# Patient Record
Sex: MALE | Race: WHITE | NOT HISPANIC OR LATINO | Employment: STUDENT | URBAN - METROPOLITAN AREA
[De-identification: names, ages, dates, MRNs, and addresses within clinical notes are randomized per-mention and may not be internally consistent; named-entity substitution may affect disease eponyms.]

---

## 2020-01-21 ENCOUNTER — OFFICE VISIT (OUTPATIENT)
Dept: INTERNAL MEDICINE CLINIC | Facility: CLINIC | Age: 25
End: 2020-01-21
Payer: COMMERCIAL

## 2020-01-21 VITALS
BODY MASS INDEX: 39.11 KG/M2 | SYSTOLIC BLOOD PRESSURE: 130 MMHG | DIASTOLIC BLOOD PRESSURE: 82 MMHG | OXYGEN SATURATION: 97 % | HEIGHT: 67 IN | HEART RATE: 88 BPM | WEIGHT: 249.2 LBS | TEMPERATURE: 98.5 F

## 2020-01-21 DIAGNOSIS — I49.1 PREMATURE ATRIAL CONTRACTION: Primary | ICD-10-CM

## 2020-01-21 DIAGNOSIS — Z23 NEED FOR TDAP VACCINATION: ICD-10-CM

## 2020-01-21 DIAGNOSIS — R94.31 ST SEGMENT ABNORMALITY: ICD-10-CM

## 2020-01-21 DIAGNOSIS — Z00.00 HEALTHCARE MAINTENANCE: ICD-10-CM

## 2020-01-21 DIAGNOSIS — Z11.4 ENCOUNTER FOR SCREENING FOR HIV: ICD-10-CM

## 2020-01-21 DIAGNOSIS — I49.9 IRREGULAR HEART RATE: ICD-10-CM

## 2020-01-21 PROCEDURE — 90715 TDAP VACCINE 7 YRS/> IM: CPT | Performed by: NURSE PRACTITIONER

## 2020-01-21 PROCEDURE — 1036F TOBACCO NON-USER: CPT | Performed by: NURSE PRACTITIONER

## 2020-01-21 PROCEDURE — 99204 OFFICE O/P NEW MOD 45 MIN: CPT | Performed by: NURSE PRACTITIONER

## 2020-01-21 PROCEDURE — 3008F BODY MASS INDEX DOCD: CPT | Performed by: NURSE PRACTITIONER

## 2020-01-21 PROCEDURE — 93000 ELECTROCARDIOGRAM COMPLETE: CPT | Performed by: NURSE PRACTITIONER

## 2020-01-21 PROCEDURE — 90471 IMMUNIZATION ADMIN: CPT | Performed by: NURSE PRACTITIONER

## 2020-01-21 RX ORDER — FEXOFENADINE HYDROCHLORIDE 60 MG/1
60 TABLET, FILM COATED ORAL DAILY
COMMUNITY

## 2020-01-21 NOTE — ASSESSMENT & PLAN NOTE
EKG performed in the office today which showed a nonspecific ST abnormality  Echocardiogram and referral to Cardiology has been ordered  The patient is asymptomatic

## 2020-01-21 NOTE — ASSESSMENT & PLAN NOTE
On physical exam the patient did have an irregular heart rate and EKG was performed in the office today  EKG did show a sinus rhythm with premature atrial complexes and nonspecific ST abnormality  An echocardiogram has been ordered for the patient  He is asymptomatic and is unaware of his irregular heart rate  He is unsure if this is something he has had in his past as I do not have his previous medical records and he is new to this practice

## 2020-01-21 NOTE — PATIENT INSTRUCTIONS
Good fat  Good fats come mainly from vegetables, nuts, seeds, and fish  They differ from saturated fats by having fewer hydrogen atoms bonded to their carbon chains  Healthy fats are liquid at room temperature, not solid  There are two broad categories of beneficial fats: monounsaturated and polyunsaturated fats  Monounsaturated fats  When you dip your bread in olive oil at an [place], you're getting mostly monounsaturated fat  Monounsaturated fats have a single carbon-to-carbon double bond  The result is that it has two fewer hydrogen atoms than a saturated fat and a bend at the double bond  This structure keeps monounsaturated fats liquid at room temperature  Good sources of monounsaturated fats are olive oil, peanut oil, canola oil, avocados, and most nuts, as well as high-oleic safflower and sunflower oils  The discovery that monounsaturated fat could be healthful came from the [place] Study during the 1960s  It revealed that people in [place] and other parts of the [facility] enjoyed a low rate of heart disease despite a high-fat diet  The main fat in their diet, though, was not the saturated animal fat common in countries with higher rates of heart disease  It was olive oil, which contains mainly monounsaturated fat  This finding produced a surge of interest in olive oil and the "Mediterranean diet," a style of eating regarded as a healthful choice today  Although there's no recommended daily intake of monounsaturated fats, the [place] [employer] recommends using them as much as possible along with polyunsaturated fats to replace saturated and trans fats  Polyunsaturated fats  When you pour liquid cooking oil into a pan, there's a good chance you're using polyunsaturated fat  Corn oil, sunflower oil, and safflower oil are common examples  Polyunsaturated fats are essential fats  That means they're required for normal body functions but your body can't make them   So you must get them from food  Polyunsaturated fats are used to build cell membranes and the covering of nerves  They are needed for blood clotting, muscle movement, and inflammation  A polyunsaturated fat has two or more double bonds in its carbon chain  There are two main types of polyunsaturated fats: omega-3 fatty acids and omega-6 fatty acids  The numbers refer to the distance between the beginning of the carbon chain and the first double bond  Both types offer health benefits  Eating polyunsaturated fats in place of saturated fats or highly refined carbohydrates reduces harmful LDL cholesterol and improves the cholesterol profile  It also lowers triglycerides  Good sources of omega-3 fatty acids include fatty fish such as salmon, mackerel, and sardines, flaxseeds, walnuts, canola oil, and unhydrogenated soybean oil  Omega-3 fatty acids may help prevent and even treat heart disease and stroke  In addition to reducing blood pressure, raising HDL, and lowering triglycerides, polyunsaturated fats may help prevent lethal heart rhythms from arising  Evidence also suggests they may help reduce the need for corticosteroid medications in people with rheumatoid arthritis  Studies linking omega-3s to a wide range of other health improvements, including reducing risk of dementia, are inconclusive, and some of them have major flaws, according to a systematic review of the evidence by the Agency for Healthcare Research and Quality  Omega-6 fatty acids have also been linked to protection against heart disease  Foods rich in linoleic acid and other omega-6 fatty acids include vegetable oils such as safflower, soybean, sunflower, walnut, and corn oils  Weight Management   WHAT YOU NEED TO KNOW:   Being overweight increases your risk of health conditions such as heart disease, high blood pressure, type 2 diabetes, and certain types of cancer   It can also increase your risk for osteoarthritis, sleep apnea, and other respiratory problems  Aim for a slow, steady weight loss  Even a small amount of weight loss can lower your risk of health problems  DISCHARGE INSTRUCTIONS:   How to lose weight safely:  A safe and healthy way to lose weight is to eat fewer calories and get regular exercise  You can lose up about 1 pound a week by decreasing the number of calories you eat by 500 calories each day  You can decrease calories by eating smaller portion sizes or by cutting out high-calorie foods  Read labels to find out how many calories are in the foods you eat  You can also burn calories with exercise such as walking, swimming, or biking  You will be more likely to keep weight off if you make these changes part of your lifestyle  Healthy meal plan for weight management:  A healthy meal plan includes a variety of foods, contains fewer calories, and helps you stay healthy  A healthy meal plan includes the following:  · Eat whole-grain foods more often  A healthy meal plan should contain fiber  Fiber is the part of grains, fruits, and vegetables that is not broken down by your body  Whole-grain foods are healthy and provide extra fiber in your diet  Some examples of whole-grain foods are whole-wheat breads and pastas, oatmeal, brown rice, and bulgur  · Eat a variety of vegetables every day  Include dark, leafy greens such as spinach, kale, clyde greens, and mustard greens  Eat yellow and orange vegetables such as carrots, sweet potatoes, and winter squash  · Eat a variety of fruits every day  Choose fresh or canned fruit (canned in its own juice or light syrup) instead of juice  Fruit juice has very little or no fiber  · Eat low-fat dairy foods  Drink fat-free (skim) milk or 1% milk  Eat fat-free yogurt and low-fat cottage cheese  Try low-fat cheeses such as mozzarella and other reduced-fat cheeses  · Choose meat and other protein foods that are low in fat    Choose beans or other legumes such as split peas or lentils  Choose fish, skinless poultry (chicken or turkey), or lean cuts of red meat (beef or pork)  Before you cook meat or poultry, cut off any visible fat  · Use less fat and oil  Try baking foods instead of frying them  Add less fat, such as margarine, sour cream, regular salad dressing, and mayonnaise to foods  Eat fewer high-fat foods  Some examples of high-fat foods include french fries, doughnuts, ice cream, and cakes  · Eat fewer sweets  Limit foods and drinks that are high in sugar  This includes candy, cookies, regular soda, and sweetened drinks  Ways to decrease calories:   · Eat smaller portions  ¨ Use a small plate with smaller servings  ¨ Do not eat second helpings  ¨ When you eat at a restaurant, ask for a box and place half of your meal in the box before you eat  ¨ Share an entrée with someone else  · Replace high-calorie snacks with healthy, low-calorie snacks  ¨ Choose fresh fruit, vegetables, fat-free rice cakes, or air-popped popcorn instead of potato chips, nuts, or chocolate  ¨ Choose water or calorie-free drinks instead of soda or sweetened drinks  · Eat regular meals  Skipping meals can lead to overeating later in the day  Eat a healthy snack in place of a meal if you do not have time to eat a regular meal      · Do not shop for groceries when you are hungry  You may be more likely to make unhealthy food choices  Take a grocery list of healthy foods and shop after you have eaten  Exercise:  Exercise at least 30 minutes per day on most days of the week  Some examples of exercise include walking, biking, dancing, and swimming  You can also fit in more physical activity by taking the stairs instead of the elevator or parking farther away from stores  Ask your healthcare provider about the best exercise plan for you     Other things to consider as you try to lose weight:   · Be aware of situations that may give you the urge to overeat, such as eating while watching television  Find ways to avoid these situations  For example, read a book, go for a walk, or do crafts  · Meet with a weight loss support group or friends who are also trying to lose weight  This may help you stay motivated to continue working on your weight loss goals  © 2017 [address] [employer] Information is for End User's use only and may not be sold, redistributed or otherwise used for commercial purposes  All illustrations and images included in CareNotes® are the copyrighted property of [employer]  or Jon Stevens  The above information is an  only  It is not intended as medical advice for individual conditions or treatments  Talk to your doctor, nurse or pharmacist before following any medical regimen to see if it is safe and effective for you  Premature Atrial Contractions   WHAT YOU NEED TO KNOW:   Premature atrial contractions (PACs) are an interruption in your heart rhythm  PACs happen when your heart gets an early signal to pump  PACs are common and usually have no cause  Most people have skipped heartbeats from time to time  Follow up with your healthcare provider so the cause of your [facility] can be diagnosed and treated  DISCHARGE INSTRUCTIONS:   Call 911 for any of the following: You have any of the following signs of a heart attack:   · Squeezing, pressure, or pain in your chest that lasts longer than 5 minutes or returns    · Discomfort or pain in your back, neck, jaw, stomach, or arm     · Trouble breathing    · Nausea or vomiting    · Lightheadedness or a sudden cold sweat, especially with chest pain or trouble breathing  Contact your healthcare provider if:   · Your symptoms do not go away, or they get worse  · You have questions or concerns about your condition or care  Medicines:   · Medicines  may be given to make your heart beat at a regular rate and rhythm  · Take your medicine as directed    Contact your healthcare provider if you think your medicine is not helping or if you have side effects  Tell him or her if you are allergic to any medicine  Keep a list of the medicines, vitamins, and herbs you take  Include the amounts, and when and why you take them  Bring the list or the pill bottles to follow-up visits  Carry your medicine list with you in case of an emergency  Follow up with your healthcare provider as directed:  Bring your Holter monitor and results with you  Write down your questions so you remember to ask them during your visits  Prevent more PACs:   · Do not have alcohol or caffeine  These can increase your PACs  · Do not smoke  Nicotine and other chemicals in cigarettes and cigars can increase heart problems  Ask your healthcare provider for information if you currently smoke and need help to quit  E-cigarettes or smokeless tobacco still contain nicotine  Talk to your healthcare provider before you use these products  · Exercise as directed  Exercise helps keep your heart healthy  Ask your healthcare provider about the best exercise plan for you  © 2017 [facility] [facility] Information is for End User's use only and may not be sold, redistributed or otherwise used for commercial purposes  All illustrations and images included in CareNotes® are the copyrighted property of [employer] [facility] [employer]  or Jon Stevens  The above information is an  only  It is not intended as medical advice for individual conditions or treatments  Talk to your doctor, nurse or pharmacist before following any medical regimen to see if it is safe and effective for you

## 2020-01-21 NOTE — PROGRESS NOTES
INTERNAL MEDICINE INITIAL OFFICE VISIT  [facility] [address] [place] [address]    NAME: Walt Christopher  AGE: 22 y o  SEX: male  : 1995     DATE: 2020     Assessment and Plan:     Problem List Items Addressed This Visit        Cardiovascular and Mediastinum    Premature atrial contraction - Primary       On physical exam the patient did have an irregular heart rate and EKG was performed in the office today  EKG did show a sinus rhythm with premature atrial complexes and nonspecific ST abnormality  An echocardiogram has been ordered for the patient  He is asymptomatic and is unaware of his irregular heart rate  He is unsure if this is something he has had in his past as I do not have his previous medical records and he is new to this practice  Relevant Orders    Echo complete with contrast if indicated    Ambulatory referral to Cardiology       Other    ST segment abnormality       EKG performed in the office today which showed a nonspecific ST abnormality  Echocardiogram and referral to Cardiology has been ordered  The patient is asymptomatic  Relevant Orders    Echo complete with contrast if indicated    Ambulatory referral to Cardiology      Other Visit Diagnoses     Healthcare maintenance        Relevant Orders    Hemoglobin A1C    TSH, 3rd generation with Free T4 reflex    Lipid Panel with Direct LDL reflex    Comprehensive metabolic panel    CBC    UA (URINE) with reflex to Scope    TDAP VACCINE GREATER THAN OR EQUAL TO 8YO IM (Completed)    Encounter for screening for HIV        Relevant Orders    HIV 1/2 AG-AB combo    Need for Tdap vaccination        Relevant Orders    TDAP VACCINE GREATER THAN OR EQUAL TO 8YO IM (Completed)    Irregular heart rate        Relevant Orders    POCT ECG          No follow-ups on file       Chief Complaint:     Chief Complaint   Patient presents with   [address] [place] [address] Patient      History of Present Illness: Chiqui Wolff presents to the office today for new patient visit  He is a 26-year-old male who is new to the practice  The patient does not have any pertinent past medical history with the exception of seasonal allergies  His only surgery was wisdom teeth removal   He does not have any current lab work to review  The patient was given a Tdap booster in the office today  A flu shot was recommended, however, we do not have any regular flu shot left in stock and the patient was recommended to get this performed at a local pharmacy  The patient's BMI is 39 and a discussion was had with the patient regarding weight management and healthy eating  On physical exam the patient did have an irregular heart rate and EKG was performed in the office today  The patient denies any chest pain, shortness of breath or lower extremity edema  It does show normal sinus rhythm with premature atrial complexes and nonspecific ST abnormality  An echocardiogram was ordered for the patient along with a referral cardiology  Blood work has also been ordered for this patient  The following portions of the patient's history were reviewed and updated as appropriate: allergies, current medications, past family history, past medical history, past social history, past surgical history and problem list      Review of Systems:     Review of Systems   Constitutional: Negative  HENT: Negative  Eyes: Positive for visual disturbance (wears glasses)  Respiratory: Negative  Cardiovascular: Negative  Gastrointestinal: Negative  Genitourinary: Negative  Musculoskeletal: Negative  Skin: Negative  Neurological: Negative  Psychiatric/Behavioral: Negative  Past Medical History:   No past medical history on file       Past Surgical History:     Past Surgical History:   Procedure Laterality Date    WISDOM TOOTH EXTRACTION  2016    all 4         Social History:     Social History     Socioeconomic History    Marital status: Single     Spouse name: None    Number of children: None    Years of education: None    Highest education level: None   Occupational History    None   Social Needs    Financial resource strain: None    Food insecurity:     Worry: None     Inability: None    Transportation needs:     Medical: None     Non-medical: None   Tobacco Use    Smoking status: Never Smoker    Smokeless tobacco: Never Used   Substance and Sexual Activity    Alcohol use: Yes     Frequency: 2-4 times a month     Drinks per session: 1 or 2     Binge frequency: Never    Drug use: Not Currently    Sexual activity: Not Currently   Lifestyle    Physical activity:     Days per week: 5 days     Minutes per session: 30 min    Stress: Not at all   Relationships    Social connections:     Talks on phone: None     Gets together: None     Attends Pentecostalism service: None     Active member of club or organization: None     Attends meetings of clubs or organizations: None     Relationship status: None    Intimate partner violence:     Fear of current or ex partner: None     Emotionally abused: None     Physically abused: None     Forced sexual activity: None   Other Topics Concern    None   Social History Narrative    None         Family History:   No family history on file  Current Medications:     Current Outpatient Medications:     fexofenadine (ALLEGRA) 60 MG tablet, Take 60 mg by mouth daily, Disp: , Rfl:      Allergies: Allergies   Allergen Reactions    Codeine         Physical Exam:     /82 (BP Location: Left arm, Patient Position: Sitting)   Pulse 88   Temp 98 5 °F (36 9 °C)   Ht 5' 7" (1 702 m)   Wt 113 kg (249 lb 3 2 oz)   SpO2 97%   BMI 39 03 kg/m²     Physical Exam   Constitutional: He is oriented to person, place, and time  He appears well-developed and well-nourished  No distress  HENT:   Head: Normocephalic and atraumatic     Right Ear: External ear normal    Left Ear: External ear normal  Nose: Nose normal    Mouth/Throat: Oropharynx is clear and moist  No oropharyngeal exudate  Eyes: Pupils are equal, round, and reactive to light  EOM are normal    Neck: Normal range of motion  Neck supple  No JVD present  Cardiovascular: Normal rate  An irregular rhythm present  No murmur heard  Pulmonary/Chest: Effort normal and breath sounds normal    Abdominal: Soft  Bowel sounds are normal    Musculoskeletal: Normal range of motion  Lymphadenopathy:     He has no cervical adenopathy  Neurological: He is alert and oriented to person, place, and time  Skin: Skin is warm and dry  Psychiatric: He has a normal mood and affect   His behavior is normal  Judgment and thought content normal          [address]

## 2020-01-31 ENCOUNTER — TELEPHONE (OUTPATIENT)
Dept: CARDIOLOGY CLINIC | Facility: CLINIC | Age: 25
End: 2020-01-31

## 2020-02-18 ENCOUNTER — CONSULT (OUTPATIENT)
Dept: CARDIOLOGY CLINIC | Facility: CLINIC | Age: 25
End: 2020-02-18
Payer: COMMERCIAL

## 2020-02-18 VITALS
SYSTOLIC BLOOD PRESSURE: 126 MMHG | DIASTOLIC BLOOD PRESSURE: 78 MMHG | BODY MASS INDEX: 38.44 KG/M2 | OXYGEN SATURATION: 90 % | RESPIRATION RATE: 18 BRPM | HEIGHT: 67 IN | WEIGHT: 244.9 LBS | HEART RATE: 90 BPM

## 2020-02-18 DIAGNOSIS — I49.1 PREMATURE ATRIAL CONTRACTION: ICD-10-CM

## 2020-02-18 DIAGNOSIS — R94.31 ST SEGMENT ABNORMALITY: ICD-10-CM

## 2020-02-18 PROCEDURE — 3008F BODY MASS INDEX DOCD: CPT | Performed by: INTERNAL MEDICINE

## 2020-02-18 PROCEDURE — 99243 OFF/OP CNSLTJ NEW/EST LOW 30: CPT | Performed by: INTERNAL MEDICINE

## 2020-02-18 PROCEDURE — 1036F TOBACCO NON-USER: CPT | Performed by: INTERNAL MEDICINE

## 2020-02-18 NOTE — PROGRESS NOTES
Cardiology Office Note    Lilia Herrera 22 y o  male MRN: [id number]    02/18/20          Assessment/Plan:  1  PACs noted on 12 lead ECG- he is asymptomatic-  will evaluate with a 24 hour Holter moniotr      1  Premature atrial contraction  Ambulatory referral to Cardiology   2  ST segment abnormality  Ambulatory referral to Cardiology       HPI: Lilia Herrera is a 22y o  year old male with history of seasonal allergies who was referred by his PCP Hardeep Escobar to establish care  He was noted to have an irregular heart rhythm on physical examination  ECG revealed NSR with early repolarization and PACs  He denies chest pain, palpitations, presyncope, syncope, lower extremity edema or any other concerns  He denies any other past cardiac history  He denies family history of cardiac disease  He denies tobacco, alcohol and recreational drug use  The only medication he takes is Allegra for seasonal allergies  He denies any other concerns at this time  Patient Active Problem List   Diagnosis    Premature atrial contraction    ST segment abnormality       Allergies   Allergen Reactions    Codeine          Current Outpatient Medications:     fexofenadine (ALLEGRA) 60 MG tablet, Take 60 mg by mouth daily, Disp: , Rfl:     History reviewed  No pertinent past medical history      Family History   Problem Relation Age of Onset    Thyroid disease Mother     No Known Problems Father     Asthma Brother        Past Surgical History:   Procedure Laterality Date    WISDOM TOOTH EXTRACTION  2016    all 4        Social History     Socioeconomic History    Marital status: Single     Spouse name: Not on file    Number of children: Not on file    Years of education: Not on file    Highest education level: Not on file   Occupational History    Not on file   Social Needs    Financial resource strain: Not on file    Food insecurity:     Worry: Not on file     Inability: Not on file   LocBox needs: Medical: Not on file     Non-medical: Not on file   Tobacco Use    Smoking status: Never Smoker    Smokeless tobacco: Never Used   Substance and Sexual Activity    Alcohol use: Yes     Frequency: 2-4 times a month     Drinks per session: 1 or 2     Binge frequency: Never    Drug use: Not Currently    Sexual activity: Not Currently   Lifestyle    Physical activity:     Days per week: 5 days     Minutes per session: 30 min    Stress: Not at all   Relationships    Social connections:     Talks on phone: Not on file     Gets together: Not on file     Attends Nondenominational service: Not on file     Active member of club or organization: Not on file     Attends meetings of clubs or organizations: Not on file     Relationship status: Not on file    Intimate partner violence:     Fear of current or ex partner: Not on file     Emotionally abused: Not on file     Physically abused: Not on file     Forced sexual activity: Not on file   Other Topics Concern    Not on file   Social History Narrative    Not on file       Review of Systems   Constitution: Negative for diaphoresis, weight gain and weight loss  HENT: Negative for congestion  Cardiovascular: Negative for chest pain, dyspnea on exertion, irregular heartbeat, leg swelling, near-syncope, orthopnea, palpitations, paroxysmal nocturnal dyspnea and syncope  Respiratory: Negative for shortness of breath, sleep disturbances due to breathing and snoring  Hematologic/Lymphatic: Does not bruise/bleed easily  Skin: Negative for rash  Musculoskeletal: Negative for myalgias  Gastrointestinal: Negative for nausea and vomiting  Neurological: Negative for excessive daytime sleepiness and light-headedness  Psychiatric/Behavioral: The patient is not nervous/anxious          Vitals: /78   Pulse 90   Resp 18   Ht 5' 7" (1 702 m)   Wt 111 kg (244 lb 14 4 oz)   SpO2 90%   BMI 38 36 kg/m²       Physical Exam:     GEN: Alert and oriented x 3, in no acute distress  Well appearing and well nourished  HEENT: Sclera anicteric, conjunctivae pink, mucous membranes moist  Oropharynx clear  NECK: Supple, no carotid bruits, no significant JVD  Trachea midline, no thyromegaly  HEART: Regular rhythm, normal S1 and S2, no murmurs, clicks, gallops or rubs  PMI nondisplaced, no thrills  LUNGS: Clear to auscultation bilaterally; no wheezes, rales, or rhonchi  No increased work of breathing or signs of respiratory distress  ABDOMEN: Soft, nontender, nondistended, normoactive bowel sounds  EXTREMITIES: Skin warm and well perfused, no clubbing, cyanosis, or edema  NEURO: No focal findings  Normal speech  Mood and affect normal    SKIN: Normal without suspicious lesions on exposed skin        Lab Results:       No results found for: HGBA1C  No results found for: CHOL  No results found for: HDL  No results found for: LDLCALC  No results found for: TRIG  No results found for: CHOLHDL

## 2021-04-07 ENCOUNTER — OFFICE VISIT (OUTPATIENT)
Dept: INTERNAL MEDICINE CLINIC | Facility: CLINIC | Age: 26
End: 2021-04-07
Payer: COMMERCIAL

## 2021-04-07 VITALS
SYSTOLIC BLOOD PRESSURE: 116 MMHG | HEART RATE: 91 BPM | OXYGEN SATURATION: 96 % | DIASTOLIC BLOOD PRESSURE: 72 MMHG | BODY MASS INDEX: 37.98 KG/M2 | RESPIRATION RATE: 15 BRPM | WEIGHT: 242 LBS | HEIGHT: 67 IN | TEMPERATURE: 97.5 F

## 2021-04-07 DIAGNOSIS — M54.16 LUMBAR RADICULOPATHY: Primary | ICD-10-CM

## 2021-04-07 PROCEDURE — 1036F TOBACCO NON-USER: CPT | Performed by: NURSE PRACTITIONER

## 2021-04-07 PROCEDURE — 99214 OFFICE O/P EST MOD 30 MIN: CPT | Performed by: NURSE PRACTITIONER

## 2021-04-07 PROCEDURE — 3008F BODY MASS INDEX DOCD: CPT | Performed by: NURSE PRACTITIONER

## 2021-04-07 PROCEDURE — 3725F SCREEN DEPRESSION PERFORMED: CPT | Performed by: NURSE PRACTITIONER

## 2021-04-07 RX ORDER — METHOCARBAMOL 500 MG/1
500 TABLET, FILM COATED ORAL
Qty: 20 TABLET | Refills: 0 | Status: SHIPPED | OUTPATIENT
Start: 2021-04-07 | End: 2022-03-07 | Stop reason: ALTCHOICE

## 2021-04-07 RX ORDER — PREDNISONE 10 MG/1
TABLET ORAL
Qty: 30 TABLET | Refills: 0 | Status: CANCELLED | OUTPATIENT
Start: 2021-04-07

## 2021-04-07 RX ORDER — IBUPROFEN 600 MG/1
600 TABLET ORAL EVERY 6 HOURS PRN
Qty: 30 TABLET | Refills: 0 | Status: SHIPPED | OUTPATIENT
Start: 2021-04-07 | End: 2021-09-17 | Stop reason: ALTCHOICE

## 2021-04-07 RX ORDER — IBUPROFEN 200 MG
TABLET ORAL AS NEEDED
COMMUNITY
End: 2021-09-17 | Stop reason: ALTCHOICE

## 2021-04-07 NOTE — ASSESSMENT & PLAN NOTE
Patient states that on April 3rd he started with some pain in his left buttocks which radiated down his left leg to his calf  Patient states that the pain somewhat increases with activity  At the most it is a 5/10  Today in the office he is sitting on the exam table in no acute distress  He is rating his pain at a 2  He states that he has been taking 1-100 mg Motrin during the day with relief of his pain  He does do lifting at work and currently they have him on light duty  I believe this patient may have  A mild lumbar radiculopathy  He does not remember any mechanism of injury  I will send  Robaxin to the patient's pharmacy for him to take at bedtime  In addition Motrin 600 mg every 6 hours was sent to the patient's pharmacy  Side effects of the medication were discussed with the patient  I did recommend to the patient that he would not take the Motrin on an empty stomach  I did instruct him to contact me on Friday if his symptoms are not improving

## 2021-04-07 NOTE — PROGRESS NOTES
INTERNAL MEDICINE FOLLOW-UP OFFICE VISIT  [facility] - [facility]    NAME: Stacia Greenberg  AGE: 26 y o  SEX: male    DATE OF ENCOUNTER: 4/7/2021   Assessment and Plan:     Problem List Items Addressed This Visit        Nervous and Auditory    Lumbar radiculopathy - Primary       Patient states that on April 3rd he started with some pain in his left buttocks which radiated down his left leg to his calf  Patient states that the pain somewhat increases with activity  At the most it is a 5/10  Today in the office he is sitting on the exam table in no acute distress  He is rating his pain at a 2  He states that he has been taking 1-100 mg Motrin during the day with relief of his pain  He does do lifting at work and currently they have him on light duty  I believe this patient may have  A mild lumbar radiculopathy  He does not remember any mechanism of injury  I will send  Robaxin to the patient's pharmacy for him to take at bedtime  In addition Motrin 600 mg every 6 hours was sent to the patient's pharmacy  Side effects of the medication were discussed with the patient  I did recommend to the patient that he would not take the Motrin on an empty stomach  I did instruct him to contact me on Friday if his symptoms are not improving  Relevant Medications    methocarbamol (ROBAXIN) 500 mg tablet    ibuprofen (MOTRIN) 600 mg tablet          No follow-ups on file  Counseling:     · Medication Side Effects - Adverse side effects of medications were reviewed with the patient/guardian today: Yes  · Counseling was given regarding: Intructions for management, Importance of tx compliance and Risk factor reductions    · Barriers to treatment include: No identified barriers      Chief Complaint:     Chief Complaint   Patient presents with    Pain     left leg        History of Present Illness:     ROSALINDA Mendez to the office today with a new concern of left buttocks pain which radiates down his left leg  He states this began on April 3rd  He denies any mechanism of injury  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  He currently is on light duty  Patient denies any loss of bowel or bladder  He has been taking 1 ibuprofen with relief of his pain during the day  On exam he does have a positive left leg lift  He is comfortable sitting on the exam table today and rates his pain at a 2/10  He does have some increased pain at night when falling asleep  I did have discussion with the patient his mother regarding treatment of lumbar radiculopathy  Since he does not have severe pain at the moment I will decline prescribing steroids  I did recommend ibuprofen 600 mg every 6 hours as needed  In addition Robaxin was sent to the patient's pharmacy for him to take at bedtime  Side effects of these medications were discussed with the patient  He has been advised that should this pain continued to worsen he should contact my office on Friday  The following portions of the patient's history were reviewed and updated as appropriate: allergies, current medications, past family history, past medical history, past social history, past surgical history and problem list      Review of Systems:     Review of Systems   Constitutional: Negative  Negative for fatigue  HENT: Negative  Negative for congestion, postnasal drip, rhinorrhea and trouble swallowing  Eyes: Negative  Negative for visual disturbance  Respiratory: Negative  Negative for choking and shortness of breath  Cardiovascular: Negative  Negative for chest pain  Gastrointestinal: Negative  Endocrine: Negative  Genitourinary: Negative  Musculoskeletal: Positive for back pain  Negative for arthralgias, myalgias and neck pain  Skin: Negative  Neurological: Negative for dizziness and headaches  Psychiatric/Behavioral: Negative           Problem List:     Patient Active Problem List   Diagnosis    Premature atrial contraction    ST segment abnormality        Objective:     /72 (BP Location: Left arm, Patient Position: Sitting, Cuff Size: Large)   Pulse 91   Temp 97 5 °F (36 4 °C) (Temporal) Comment: no nsaids  Resp 15   Wt 110 kg (242 lb)   SpO2 96%   BMI 37 90 kg/m²     Physical Exam  Constitutional:       General: He is not in acute distress  Appearance: He is well-developed  He is obese  HENT:      Head: Normocephalic and atraumatic  Eyes:      Pupils: Pupils are equal, round, and reactive to light  Neck:      Musculoskeletal: Normal range of motion  Cardiovascular:      Rate and Rhythm: Normal rate and regular rhythm  Heart sounds: Normal heart sounds  No murmur  Pulmonary:      Effort: Pulmonary effort is normal  No respiratory distress  Breath sounds: Normal breath sounds  No wheezing  Musculoskeletal: Normal range of motion  Left upper leg: He exhibits tenderness  Skin:     General: Skin is warm and dry  Neurological:      General: No focal deficit present  Mental Status: He is alert and oriented to person, place, and time  Psychiatric:         Mood and Affect: Mood normal          Behavior: Behavior normal          Thought Content: Thought content normal          Judgment: Judgment normal         Current Medications:     Current Outpatient Medications   Medication Sig Dispense Refill    fexofenadine (ALLEGRA) 60 MG tablet Take 60 mg by mouth daily      ibuprofen (MOTRIN) 200 mg tablet Take by mouth as needed for mild pain       No current facility-administered medications for this visit  There are no Patient Instructions on file for this visit      MARILYN Redman  [facility]

## 2021-04-07 NOTE — ASSESSMENT & PLAN NOTE
The patient's BMI in the office today is 37 9  The patient has decreased his intake of junk food  He has lost 7 lb since January

## 2021-04-15 ENCOUNTER — IMMUNIZATIONS (OUTPATIENT)
Dept: FAMILY MEDICINE CLINIC | Facility: HOSPITAL | Age: 26
End: 2021-04-15

## 2021-04-15 DIAGNOSIS — Z23 ENCOUNTER FOR IMMUNIZATION: Primary | ICD-10-CM

## 2021-04-15 PROCEDURE — 0001A SARS-COV-2 / COVID-19 MRNA VACCINE (PFIZER-BIONTECH) 30 MCG: CPT

## 2021-04-15 PROCEDURE — 91300 SARS-COV-2 / COVID-19 MRNA VACCINE (PFIZER-BIONTECH) 30 MCG: CPT

## 2021-05-08 ENCOUNTER — IMMUNIZATIONS (OUTPATIENT)
Dept: FAMILY MEDICINE CLINIC | Facility: HOSPITAL | Age: 26
End: 2021-05-08

## 2021-05-08 DIAGNOSIS — Z23 ENCOUNTER FOR IMMUNIZATION: Primary | ICD-10-CM

## 2021-05-08 PROCEDURE — 0002A SARS-COV-2 / COVID-19 MRNA VACCINE (PFIZER-BIONTECH) 30 MCG: CPT

## 2021-05-08 PROCEDURE — 91300 SARS-COV-2 / COVID-19 MRNA VACCINE (PFIZER-BIONTECH) 30 MCG: CPT

## 2021-09-17 ENCOUNTER — OFFICE VISIT (OUTPATIENT)
Dept: INTERNAL MEDICINE CLINIC | Facility: CLINIC | Age: 26
End: 2021-09-17
Payer: COMMERCIAL

## 2021-09-17 VITALS
DIASTOLIC BLOOD PRESSURE: 86 MMHG | HEIGHT: 67 IN | SYSTOLIC BLOOD PRESSURE: 118 MMHG | BODY MASS INDEX: 37.95 KG/M2 | WEIGHT: 241.8 LBS | HEART RATE: 218 BPM | OXYGEN SATURATION: 97 % | TEMPERATURE: 100.8 F

## 2021-09-17 DIAGNOSIS — B34.9 VIRAL ILLNESS: Primary | ICD-10-CM

## 2021-09-17 PROCEDURE — 1036F TOBACCO NON-USER: CPT | Performed by: NURSE PRACTITIONER

## 2021-09-17 PROCEDURE — U0005 INFEC AGEN DETEC AMPLI PROBE: HCPCS | Performed by: NURSE PRACTITIONER

## 2021-09-17 PROCEDURE — 3008F BODY MASS INDEX DOCD: CPT | Performed by: NURSE PRACTITIONER

## 2021-09-17 PROCEDURE — U0003 INFECTIOUS AGENT DETECTION BY NUCLEIC ACID (DNA OR RNA); SEVERE ACUTE RESPIRATORY SYNDROME CORONAVIRUS 2 (SARS-COV-2) (CORONAVIRUS DISEASE [COVID-19]), AMPLIFIED PROBE TECHNIQUE, MAKING USE OF HIGH THROUGHPUT TECHNOLOGIES AS DESCRIBED BY CMS-2020-01-R: HCPCS | Performed by: NURSE PRACTITIONER

## 2021-09-17 PROCEDURE — 99214 OFFICE O/P EST MOD 30 MIN: CPT | Performed by: NURSE PRACTITIONER

## 2021-09-17 NOTE — PATIENT INSTRUCTIONS
Tylenol or motrin for fever  Mucinex or Robitussin multi symptom medication over the counter  No work until [place] test back and negative and/or no fever for 24 hours  Self quarantine at home until COVID test back      [facility] for swabbing today: [address] in [place]     Viral Syndrome   AMBULATORY CARE:   Viral syndrome  is a term used for symptoms of an infection caused by a virus  Viruses are spread easily from person to person through the air and on shared items  Signs and symptoms  may start slowly or suddenly and last hours to days  They can be mild to severe and can change over days or hours  You may have any of the following:  · Fever and chills    · A runny or stuffy nose    · Cough, sore throat, or hoarseness    · Headache, or pain and pressure around your eyes    · Muscle aches and joint pain    · Shortness of breath or wheezing    · Abdominal pain, cramps, and diarrhea    · Nausea, vomiting, or loss of appetite    Call your local emergency number (911 in the [address] [facility]) or have someone else call if:   · You have a seizure  · You cannot be woken  · You have chest pain or trouble breathing  Seek care immediately if:   · You have a stiff neck, a bad headache, and sensitivity to light  · You feel weak, dizzy, or confused  · You stop urinating or urinate a lot less than usual     · You cough up blood or thick yellow or green mucus  · You have severe abdominal pain or your abdomen is larger than usual     Call your doctor if:   · Your symptoms do not get better with treatment or get worse after 3 days  · You have a rash or ear pain  · You have burning when you urinate  · You have questions or concerns about your condition or care  Treatment for viral syndrome  may include medicines to manage your symptoms  Antibiotics are not given for a viral infection  You may  need any of the following:  · Acetaminophen  decreases pain and fever  It is available without a doctor's order  Ask how much to take and how often to take it  Follow directions  Read the labels of all other medicines you are using to see if they also contain acetaminophen, or ask your doctor or pharmacist  Acetaminophen can cause liver damage if not taken correctly  Do not use more than 4 grams (4,000 milligrams) total of acetaminophen in one day  · NSAIDs , such as ibuprofen, help decrease swelling, pain, and fever  NSAIDs can cause stomach bleeding or kidney problems in certain people  If you take blood thinner medicine, always ask your healthcare provider if NSAIDs are safe for you  Always read the medicine label and follow directions  · Cold medicine  helps decrease swelling, control a cough, and relieve chest or nasal congestion  · Saline nasal spray  helps decrease nasal congestion  Manage your symptoms:   · Drink liquids as directed to prevent dehydration  Ask how much liquid to drink each day and which liquids are best for you  Ask if you should drink an oral rehydration solution (ORS)  An ORS has the right amounts of water, salts, and sugar you need to replace body fluids  This may help prevent dehydration caused by vomiting or diarrhea  Do not drink liquids with caffeine  Liquids with caffeine can make dehydration worse  · Get plenty of rest to help your body heal   Take naps throughout the day  Ask your healthcare provider when you can return to work and your normal activities  · Use a cool mist humidifier to help you breathe easier  Ask your healthcare provider how to use a cool mist humidifier  · Eat honey or use cough drops for a sore throat  Cough drops are available without a doctor's order  Follow directions for taking cough drops  · Do not smoke or be close to anyone who is smoking  Nicotine and other chemicals in cigarettes and cigars can cause lung damage  Smoking can also delay healing  Ask your healthcare provider for information if you currently smoke and need help to quit  E-cigarettes or smokeless tobacco still contain nicotine  Talk to your healthcare provider before you use these products  Prevent the spread of germs:       · Wash your hands often  Wash your hands several times each day  Wash after you use the bathroom, change a child's diaper, and before you prepare or eat food  Use soap and water every time  Rub your soapy hands together, lacing your fingers  Wash the front and back of your hands, and in between your fingers  Use the fingers of one hand to scrub under the fingernails of the other hand  Wash for at least 20 seconds  Rinse with warm, running water for several seconds  Then dry your hands with a clean towel or paper towel  Use hand  that contains alcohol if soap and water are not available  Do not touch your eyes, nose, or mouth without washing your hands first          · Cover a sneeze or cough  Use a tissue that covers your mouth and nose  Throw the tissue away in a trash can right away  Use the bend of your arm if a tissue is not available  Wash your hands well with soap and water or use a hand   · Stay away from others while you are sick  Avoid crowds as much as possible  · Ask about vaccines you may need  Talk to your healthcare provider about your vaccine history  He or she will tell you which vaccines you need, and when to get them  ? Get the influenza (flu) vaccine as soon as recommended each year  The flu vaccine is available starting in September or October  Flu viruses change, so it is important to get a flu vaccine every year  ? Get the pneumonia vaccine if recommended  This vaccine is usually recommended every 5 years  Your provider will tell you when to get this vaccine, if needed  Follow up with your doctor as directed:  Write down your questions so you remember to ask them during your visits    © Copyright [employer] 2021 Information is for End User's use only and may not be sold, redistributed or otherwise used for commercial purposes  All illustrations and images included in CareNotes® are the copyrighted property of [employer] D A M , Inc  or [employer]  The above information is an  only  It is not intended as medical advice for individual conditions or treatments  Talk to your doctor, nurse or pharmacist before following any medical regimen to see if it is safe and effective for you

## 2021-09-17 NOTE — PROGRESS NOTES
COVID-19 Outpatient Progress Note    Assessment/Plan:    Problem List Items Addressed This Visit     None      Visit Diagnoses     Viral illness    -  Primary    Relevant Orders    Novel Coronavirus (Covid-19),PCR [facility] - Collected at [facility] or [facility]         Disposition:     I referred patient to one of our centralized sites for a COVID-19 swab  The patient  States he started several days ago with symptoms of an upper respiratory tract infection  The patient states he is having nasal congestion, runny nose, sore throat, headache  Today in the office he does have a temperature of 100 8°  Patient is fully vaccinated  He does work at [employer]  He states he does wear a mask when he is out in the community  I did recommend to the patient that we swab him for COVID-19 today  He will need to go to the [facility] facility in [facility] for that swab and an order was placed  He was provided with the address  In addition information was provided to the patient regarding symptom management with over-the-counter medications  He has been instructed that should his symptoms worsen over the weekend he should be evaluated in the emergency room  In addition he has been instructed that he should self quarantine at home and not return to work until his covered chest is negative and he is fever free for 24 hours  I have spent 15 minutes directly with the patient  Greater than 50% of this time was spent in counseling/coordination of care regarding: instructions for management  Verification of patient location:    Patient is located in the following state in which I hold an active license [place]    Encounter provider MARILYN Rodriguez    Provider located at [address] [place] [address]    Recent Visits  No visits were found meeting these conditions    Showing recent visits within past 7 days and meeting all other requirements  Today's Visits  Date Type Provider Dept   09/17/21 Office Visit Satinder Root, [address] today's visits and meeting all other requirements  Future Appointments  No visits were found meeting these conditions  Showing future appointments within next 150 days and meeting all other requirements     Subjective:   Dandy Ferreira is a 23 y o  male who is concerned about COVID-19  Patient's symptoms include fever, fatigue, nasal congestion, rhinorrhea, sore throat, cough and headache  Patient denies shortness of breath and myalgias  COVID-19 vaccination status: Fully vaccinated    Exposure:   Contact with a person who is under investigation (PUI) for or who is positive for COVID-19 within the last 14 days?: No    Hospitalized recently for fever and/or lower respiratory symptoms?: No      Currently a healthcare worker that is involved in direct patient care?: No      Works in a special setting where the risk of COVID-19 transmission may be high? (this may include long-term care, correctional and halfway facilities; homeless shelters; assisted-living facilities and group homes ): No      Resident in a special setting where the risk of COVID-19 transmission may be high? (this may include long-term care, correctional and halfway facilities; homeless shelters; assisted-living facilities and group homes ): No      No results found for: [address], [address] [facility], [address] [place] [address],[address] [address]  History reviewed  No pertinent past medical history  Past Surgical History:   Procedure Laterality Date    WISDOM TOOTH EXTRACTION  2016    all 4      Current Outpatient Medications   Medication Sig Dispense Refill    fexofenadine (ALLEGRA) 60 MG tablet Take 60 mg by mouth daily      methocarbamol (ROBAXIN) 500 mg tablet Take 1 tablet (500 mg total) by mouth daily at bedtime 20 tablet 0     No current facility-administered medications for this visit       Allergies   Allergen Reactions    Codeine        Review of Systems   Constitutional: Positive for fatigue and fever  HENT: Positive for congestion, rhinorrhea and sore throat  Negative for postnasal drip and trouble swallowing  Eyes: Negative  Negative for visual disturbance  Respiratory: Positive for cough  Negative for choking and shortness of breath  Cardiovascular: Negative  Negative for chest pain  Gastrointestinal: Negative  Endocrine: Negative  Genitourinary: Negative  Musculoskeletal: Negative  Negative for arthralgias, back pain, myalgias and neck pain  Skin: Negative  Neurological: Positive for headaches  Negative for dizziness  Psychiatric/Behavioral: Negative  Objective:    Vitals:    09/17/21 1656   BP: 118/86   BP Location: Left arm   Patient Position: Sitting   Cuff Size: Standard   Pulse: (!) 218   Temp: (!) 100 8 °F (38 2 °C)   TempSrc: Temporal   SpO2: 97%   Weight: 110 kg (241 lb 12 8 oz)   Height: 5' 7" (1 702 m)       Physical Exam  Vitals reviewed  Constitutional:       General: He is not in acute distress  Appearance: He is well-developed  He is obese  He is ill-appearing and diaphoretic  HENT:      Head: Normocephalic  Right Ear: Tympanic membrane, ear canal and external ear normal  There is no impacted cerumen  Left Ear: Tympanic membrane and external ear normal  There is no impacted cerumen  Eyes:      Pupils: Pupils are equal, round, and reactive to light  Cardiovascular:      Rate and Rhythm: Regular rhythm  Tachycardia present  Pulmonary:      Effort: Pulmonary effort is normal    Musculoskeletal:      Cervical back: Normal range of motion  Neurological:      Mental Status: He is alert and oriented to person, place, and time  Psychiatric:         Mood and Affect: Mood normal          Behavior: Behavior normal          Thought Content:  Thought content normal          Judgment: Judgment normal          VIRTUAL VISIT DISCLAIMER    Maxcine Osgood Vianney Pascual verbally agrees to participate in [employer]  Pt is aware that [employer] could be limited without vital signs or the ability to perform a full hands-on physical exam  Denton Pascual understands he or the provider may request at any time to terminate the video visit and request the patient to seek care or treatment in person

## 2022-01-21 ENCOUNTER — IMMUNIZATIONS (OUTPATIENT)
Dept: FAMILY MEDICINE CLINIC | Facility: HOSPITAL | Age: 27
End: 2022-01-21

## 2022-01-21 DIAGNOSIS — Z23 ENCOUNTER FOR IMMUNIZATION: Primary | ICD-10-CM

## 2022-01-21 PROCEDURE — 91300 COVID-19 PFIZER VACC 0.3 ML: CPT

## 2022-01-21 PROCEDURE — 0001A COVID-19 PFIZER VACC 0.3 ML: CPT

## 2022-03-07 ENCOUNTER — OFFICE VISIT (OUTPATIENT)
Dept: INTERNAL MEDICINE CLINIC | Facility: CLINIC | Age: 27
End: 2022-03-07
Payer: COMMERCIAL

## 2022-03-07 VITALS
RESPIRATION RATE: 18 BRPM | HEART RATE: 87 BPM | TEMPERATURE: 98.1 F | SYSTOLIC BLOOD PRESSURE: 128 MMHG | BODY MASS INDEX: 37.39 KG/M2 | DIASTOLIC BLOOD PRESSURE: 82 MMHG | HEIGHT: 67 IN | WEIGHT: 238.2 LBS | OXYGEN SATURATION: 97 %

## 2022-03-07 DIAGNOSIS — H65.91 RIGHT NON-SUPPURATIVE OTITIS MEDIA: Primary | ICD-10-CM

## 2022-03-07 DIAGNOSIS — J02.9 SORE THROAT: ICD-10-CM

## 2022-03-07 DIAGNOSIS — J02.9 PHARYNGITIS, UNSPECIFIED ETIOLOGY: ICD-10-CM

## 2022-03-07 DIAGNOSIS — B34.9 VIRAL INFECTION, UNSPECIFIED: ICD-10-CM

## 2022-03-07 LAB — S PYO AG THROAT QL: NEGATIVE

## 2022-03-07 PROCEDURE — 3725F SCREEN DEPRESSION PERFORMED: CPT | Performed by: NURSE PRACTITIONER

## 2022-03-07 PROCEDURE — U0003 INFECTIOUS AGENT DETECTION BY NUCLEIC ACID (DNA OR RNA); SEVERE ACUTE RESPIRATORY SYNDROME CORONAVIRUS 2 (SARS-COV-2) (CORONAVIRUS DISEASE [COVID-19]), AMPLIFIED PROBE TECHNIQUE, MAKING USE OF HIGH THROUGHPUT TECHNOLOGIES AS DESCRIBED BY CMS-2020-01-R: HCPCS | Performed by: NURSE PRACTITIONER

## 2022-03-07 PROCEDURE — 87880 STREP A ASSAY W/OPTIC: CPT | Performed by: NURSE PRACTITIONER

## 2022-03-07 PROCEDURE — 3008F BODY MASS INDEX DOCD: CPT | Performed by: NURSE PRACTITIONER

## 2022-03-07 PROCEDURE — 99214 OFFICE O/P EST MOD 30 MIN: CPT | Performed by: NURSE PRACTITIONER

## 2022-03-07 PROCEDURE — 1036F TOBACCO NON-USER: CPT | Performed by: NURSE PRACTITIONER

## 2022-03-07 PROCEDURE — U0005 INFEC AGEN DETEC AMPLI PROBE: HCPCS | Performed by: NURSE PRACTITIONER

## 2022-03-07 RX ORDER — MULTIVIT-MIN/IRON/FOLIC ACID/K 18-600-40
250 CAPSULE ORAL DAILY
COMMUNITY

## 2022-03-07 RX ORDER — AMOXICILLIN 500 MG/1
500 CAPSULE ORAL EVERY 8 HOURS SCHEDULED
Qty: 30 CAPSULE | Refills: 0 | Status: SHIPPED | OUTPATIENT
Start: 2022-03-07 | End: 2022-03-17

## 2022-03-07 NOTE — ASSESSMENT & PLAN NOTE
The patient with a sore throat since Friday  Upon exam the patient does have enlarged tonsils and some erythema  A rapid strep in the office today was negative  In addition the patient is having some sinus congestion and fatigue  A COVID test performed at home 2 days ago was negative  PCR was performed today

## 2022-03-07 NOTE — PATIENT INSTRUCTIONS
Pharyngitis   AMBULATORY CARE:   Pharyngitis , or sore throat, is inflammation of the tissues and structures in your pharynx (throat)  Pharyngitis is most often caused by bacteria  It may also be caused by a cold or flu virus  Other causes include smoking, allergies, or acid reflux  Signs and symptoms that may occur with pharyngitis:   · Sore throat or pain when you swallow    · Fever, chills, and body aches    · Hoarse or raspy voice    · Cough, runny or stuffy nose, itchy or watery eyes    · Headache    · Upset stomach and loss of appetite    · Mild neck stiffness    · Swollen glands that feel like hard lumps when you touch your neck    · White and yellow pus-filled blisters in the back of your throat    Call 911 for any of the following:   · You have trouble breathing or swallowing because your throat is swollen or sore  Seek care immediately if:   · You are drooling because it hurts too much to swallow  · Your fever is higher than 102? F (39?C) or lasts longer than 3 days  · You are confused  · You taste blood in your throat  Contact your healthcare provider if:   · Your throat pain gets worse  · You have a painful lump in your throat that does not go away after 5 days  · Your symptoms do not improve after 5 days  · You have questions or concerns about your condition or care  Treatment for pharyngitis:  Viral pharyngitis will go away on its own without treatment  Your sore throat should start to feel better in 3 to 5 days for both viral and bacterial infections  You may need any of the following:  · Antibiotics  treat a bacterial infection  · NSAIDs , such as ibuprofen, help decrease swelling, pain, and fever  NSAIDs can cause stomach bleeding or kidney problems in certain people  If you take blood thinner medicine, always ask your healthcare provider if NSAIDs are safe for you  Always read the medicine label and follow directions  · Acetaminophen  decreases pain and fever   It is available without a doctor's order  Ask how much to take and how often to take it  Follow directions  Acetaminophen can cause liver damage if not taken correctly  Manage your symptoms:   · Gargle salt water  Mix ¼ teaspoon salt in an 8 ounce glass of warm water and gargle  This may help decrease swelling in your throat  · Drink liquids as directed  You may need to drink more liquids than usual  Liquids may help soothe your throat and prevent dehydration  Ask how much liquid to drink each day and which liquids are best for you  · Use a cool-steam humidifier  to help moisten the air in your room and calm your cough  · Soothe your throat  with cough drops, ice, soft foods, or popsicles  Prevent the spread of pharyngitis:  Cover your mouth and nose when you cough or sneeze  Do not share food or drinks  Wash your hands often  Use soap and water  If soap and water are unavailable, use an alcohol based hand   Follow up with your doctor as directed:  Write down your questions so you remember to ask them during your visits  © Copyright [employer] 2022 Information is for End User's use only and may not be sold, redistributed or otherwise used for commercial purposes  All illustrations and images included in CareNotes® are the copyrighted property of [employer]  or [employer]  The above information is an  only  It is not intended as medical advice for individual conditions or treatments  Talk to your doctor, nurse or pharmacist before following any medical regimen to see if it is safe and effective for you

## 2022-03-07 NOTE — ASSESSMENT & PLAN NOTE
The patient with and injected tympanic membrane and some erythema in the ear canal   I will treat the patient with amoxicillin for an ear infection

## 2022-03-07 NOTE — PROGRESS NOTES
[place]    NAME: Mark Ragsdale  AGE: 23 y o  SEX: male  : 1995     DATE: 3/7/2022     Assessment and Plan:     Problem List Items Addressed This Visit        Digestive    Pharyngitis     The patient with a sore throat since Friday  Upon exam the patient does have enlarged tonsils and some erythema  A rapid strep in the office today was negative  In addition the patient is having some sinus congestion and fatigue  A COVID test performed at home 2 days ago was negative  PCR was performed today  Relevant Medications    amoxicillin (AMOXIL) 500 mg capsule       Nervous and Auditory    Right non-suppurative otitis media - Primary     The patient with and injected tympanic membrane and some erythema in the ear canal   I will treat the patient with amoxicillin for an ear infection  Relevant Medications    amoxicillin (AMOXIL) 500 mg capsule      Other Visit Diagnoses     Viral infection, unspecified        Relevant Orders    COVID Only - Office Collect    Sore throat        Relevant Orders    POCT rapid strepA (Completed)              No follow-ups on file  Chief Complaint:     Chief Complaint   Patient presents with    Sore Throat      rapid home test for cvid is negative, sinus , headache        History of Present Illness:   Patient presents to the office today with a 3 day history of sore throat which is worsening over the weekend, sinus congestion, nasal drainage which is clear in a minor headache  He is also complaining of fatigue since Friday  He did perform a rapid COVID test at home on Saturday which was negative  A rapid strep in the office today was negative  Patient does have some physical exam signs of an ear infection and he will be treated with amoxicillin  The PCR for COVID was performed in the office today however the patient and his mother have been made aware will not change treatment    The patient wanted this performed for work reasons  Review of Systems:     Review of Systems   Constitutional: Positive for fatigue  HENT: Positive for congestion and sore throat  Negative for postnasal drip, rhinorrhea and trouble swallowing  Eyes: Negative  Negative for visual disturbance  Respiratory: Negative  Negative for choking and shortness of breath  Cardiovascular: Negative  Negative for chest pain  Gastrointestinal: Negative  Endocrine: Negative  Genitourinary: Negative  Musculoskeletal: Negative  Negative for arthralgias, back pain, myalgias and neck pain  Skin: Negative  Neurological: Negative for dizziness and headaches  Psychiatric/Behavioral: Negative  Problem List:     Patient Active Problem List   Diagnosis    Premature atrial contraction    ST segment abnormality    Lumbar radiculopathy    BMI 37 0-37 9, adult    Right non-suppurative otitis media    Pharyngitis        Objective:     /82 (BP Location: Left arm, Patient Position: Sitting, Cuff Size: Standard)   Pulse 87   Temp 98 1 °F (36 7 °C) (Oral)   Resp 18   Ht 5' 7" (1 702 m)   Wt 108 kg (238 lb 3 2 oz)   SpO2 97%   BMI 37 31 kg/m²     Current Outpatient Medications   Medication Instructions    amoxicillin (AMOXIL) 500 mg, Oral, Every 8 hours scheduled    fexofenadine (ALLEGRA) 60 mg, Oral, Daily    Vitamin C 250 mg, Oral, Daily         Physical Exam  Vitals reviewed  Constitutional:       Appearance: Normal appearance  He is obese  HENT:      Head: Normocephalic and atraumatic  Right Ear: Tympanic membrane is injected and erythematous  Nose: Nose normal       Mouth/Throat:      Mouth: Mucous membranes are moist       Pharynx: Posterior oropharyngeal erythema present  Tonsils: No tonsillar exudate or tonsillar abscesses  3+ on the right  3+ on the left  Eyes:      Extraocular Movements: Extraocular movements intact        Pupils: Pupils are equal, round, and reactive to light  Cardiovascular:      Rate and Rhythm: Normal rate and regular rhythm  Pulses: Normal pulses  Heart sounds: Normal heart sounds  Pulmonary:      Effort: Pulmonary effort is normal       Breath sounds: Normal breath sounds  Musculoskeletal:         General: Normal range of motion  Skin:     General: Skin is warm  Neurological:      General: No focal deficit present  Mental Status: He is alert and oriented to person, place, and time  Psychiatric:         Mood and Affect: Mood normal          Behavior: Behavior normal          Thought Content:  Thought content normal          Judgment: Judgment normal          Tina King, [address] [place] [address]

## 2022-03-08 ENCOUNTER — TELEPHONE (OUTPATIENT)
Dept: INTERNAL MEDICINE CLINIC | Facility: CLINIC | Age: 27
End: 2022-03-08

## 2022-03-08 LAB — SARS-COV-2 RNA RESP QL NAA+PROBE: NEGATIVE

## 2022-03-08 NOTE — TELEPHONE ENCOUNTER
----- Message from [address] sent at 3/8/2022 10:34 AM EST -----   Let the patient know he is negative for COVID

## 2022-12-21 ENCOUNTER — OFFICE VISIT (OUTPATIENT)
Dept: INTERNAL MEDICINE CLINIC | Facility: CLINIC | Age: 27
End: 2022-12-21

## 2022-12-21 VITALS
TEMPERATURE: 97.6 F | RESPIRATION RATE: 18 BRPM | SYSTOLIC BLOOD PRESSURE: 126 MMHG | DIASTOLIC BLOOD PRESSURE: 80 MMHG | WEIGHT: 231.8 LBS | HEART RATE: 79 BPM | HEIGHT: 67 IN | BODY MASS INDEX: 36.38 KG/M2 | OXYGEN SATURATION: 97 %

## 2022-12-21 DIAGNOSIS — Z00.00 ANNUAL PHYSICAL EXAM: ICD-10-CM

## 2022-12-21 DIAGNOSIS — J06.9 VIRAL UPPER RESPIRATORY TRACT INFECTION: Primary | ICD-10-CM

## 2022-12-21 DIAGNOSIS — J02.9 PHARYNGITIS, UNSPECIFIED ETIOLOGY: ICD-10-CM

## 2022-12-21 LAB — S PYO AG THROAT QL: NEGATIVE

## 2022-12-21 RX ORDER — GUAIFENESIN 600 MG/1
1200 TABLET, EXTENDED RELEASE ORAL EVERY 12 HOURS SCHEDULED
Qty: 24 TABLET | Refills: 0 | Status: SHIPPED | OUTPATIENT
Start: 2022-12-21

## 2022-12-21 RX ORDER — AMOXICILLIN 500 MG/1
500 CAPSULE ORAL EVERY 8 HOURS SCHEDULED
Qty: 30 CAPSULE | Refills: 0 | Status: SHIPPED | OUTPATIENT
Start: 2022-12-21 | End: 2022-12-31

## 2022-12-21 NOTE — PATIENT INSTRUCTIONS
Call your doctor if:   You have a low fever  Your sore throat gets worse or you see white or yellow spots in your throat  Your symptoms get worse after 3 to 5 days or are not better in 14 days  You have a rash anywhere on your skin  You have large, tender lumps in your neck  You have thick, green, or yellow drainage from your nose  You cough up thick yellow, green, or bloody mucus  You have a bad earache  You have questions or concerns about your condition or care

## 2022-12-21 NOTE — PROGRESS NOTES
Name: Venus Carr      : 1995      MRN: [id number]  Encounter Provider: MARILYN Mckeon  Encounter Date: 2022   Encounter department: [facility]   [address]     1  Viral upper respiratory tract infection  Comments:  Cold symptoms for 5 days, home COVID test negative will rule out influenza  Continue Allegra and NyQuil  Orders:  -     Covid/Flu- Office Collect  -     guaiFENesin (MUCINEX) 600 mg 12 hr tablet; Take 2 tablets (1,200 mg total) by mouth every 12 (twelve) hours    2  Annual physical exam  Comments:  Routine blood work ordered to be performed before annual physical in 2 weeks  Orders:  -     CBC and differential; Future  -     Comprehensive metabolic panel; Future  -     Lipid Panel with Direct LDL reflex; Future  -     TSH, 3rd generation with Free T4 reflex; Future  -     HEMOGLOBIN A1C W/ EAG ESTIMATION; Future    3  Pharyngitis, unspecified etiology  Assessment & Plan: This patient has had a sore throat for 5 days, he has been using water gargle with slight improvement  Patient does have tonsillar enlargement with erythema  Point-of-care strep negative  Will treat for acute pharyngitis and reevaluate in 2 weeks  Will consider ENT referral if tonsils remain enlarged  Orders:  -     menthol-cetylpyridinium (CEPACOL) 3 MG lozenge; Take 1 lozenge (3 mg total) by mouth as needed for sore throat  -     POCT rapid strepA  -     amoxicillin (AMOXIL) 500 mg capsule; Take 1 capsule (500 mg total) by mouth every 8 (eight) hours for 10 days     F/U 2 weeks    Subjective      Cough  This is a new problem  The current episode started in the past 7 days  The problem has been rapidly improving  The problem occurs constantly  The cough is productive of sputum  Associated symptoms include headaches, nasal congestion, postnasal drip and a sore throat   Pertinent negatives include no chest pain, chills, fever, rash, rhinorrhea, shortness of breath or wheezing  Nothing aggravates the symptoms  He has tried OTC cough suppressant for the symptoms  The treatment provided no relief  His past medical history is significant for environmental allergies  Sore Throat   This is a new problem  The current episode started in the past 7 days  The problem has been gradually improving  There has been no fever  The pain is at a severity of 6/10  Associated symptoms include coughing, headaches and a plugged ear sensation  Pertinent negatives include no diarrhea, ear discharge, hoarse voice, shortness of breath, trouble swallowing or vomiting  He has had no exposure to mono  The treatment provided no relief  Review of Systems   Constitutional: Negative for chills and fever  HENT: Positive for postnasal drip and sore throat  Negative for ear discharge, hoarse voice, rhinorrhea and trouble swallowing  Eyes: Negative  Respiratory: Positive for cough  Negative for chest tightness, shortness of breath and wheezing  Cardiovascular: Negative for chest pain  Gastrointestinal: Negative for diarrhea, nausea and vomiting  Endocrine: Negative  Musculoskeletal: Negative  Skin: Negative for rash  Allergic/Immunologic: Positive for environmental allergies  Neurological: Positive for headaches  Negative for dizziness and weakness  Hematological: Negative  Psychiatric/Behavioral: Negative          Current Outpatient Medications on File Prior to Visit   Medication Sig   • Ascorbic Acid (Vitamin C) 500 MG CAPS Take 250 mg by mouth daily   • fexofenadine (ALLEGRA) 60 MG tablet Take 60 mg by mouth daily   • Pseudoeph-Doxylamine-DM-APAP (NYQUIL PO) Take by mouth       Objective     /80 (BP Location: Left arm, Patient Position: Sitting, Cuff Size: Standard)   Pulse 79   Temp 97 6 °F (36 4 °C) (Temporal)   Resp 18   Ht 5' 7" (1 702 m)   Wt 105 kg (231 lb 12 8 oz)   SpO2 97%   BMI 36 31 kg/m²     Physical Exam  Constitutional:       General: He is not in acute distress  Appearance: Normal appearance  He is not ill-appearing  HENT:      Right Ear: Tympanic membrane normal  Tympanic membrane is not erythematous (Congested)  Left Ear: Tympanic membrane normal  Tympanic membrane is not erythematous (Congested)  Nose: Congestion present  Mouth/Throat:      Pharynx: Posterior oropharyngeal erythema present  No oropharyngeal exudate  Tonsils: 3+ on the right  3+ on the left  Eyes:      Conjunctiva/sclera: Conjunctivae normal    Cardiovascular:      Rate and Rhythm: Normal rate and regular rhythm  Pulses: Normal pulses  Heart sounds: Normal heart sounds  Pulmonary:      Effort: Pulmonary effort is normal       Breath sounds: Normal breath sounds  Musculoskeletal:         General: Normal range of motion  Cervical back: Normal range of motion and neck supple  Skin:     General: Skin is warm and dry  Findings: No lesion or rash  Neurological:      General: No focal deficit present  Mental Status: He is alert and oriented to person, place, and time     Psychiatric:         Mood and Affect: Mood normal          Behavior: Behavior normal        MARILYN Lemus

## 2022-12-21 NOTE — LETTER
December 21, 2022     Patient: Timbo Vallejo  YOB: 1995  Date of Visit: 12/21/2022      To Whom it May Concern:    Timbo Vallejo is under my professional care  Imelda He was seen in my office on 12/21/2022  Imelda He may return to work on 12/27/2022  If you have any questions or concerns, please don't hesitate to call           Sincerely,          MARILYN Lemus        CC: Allison Gomez

## 2022-12-21 NOTE — ASSESSMENT & PLAN NOTE
This patient has had a sore throat for 5 days, he has been using water gargle with slight improvement  Patient does have tonsillar enlargement with erythema  Point-of-care strep negative  Will treat for acute pharyngitis and reevaluate in 2 weeks  Will consider ENT referral if tonsils remain enlarged

## 2022-12-21 NOTE — LETTER
December 21, 2022     Patient: Carol Patricia  YOB: 1995  Date of Visit: 12/21/2022      To Whom it May Concern:    Carol Patricia is under my professional care  Yahaira Gaffney was seen in my office on 12/21/2022  Yahaira Gaffney may return to work on 12/27/2022  If you have any questions or concerns, please don't hesitate to call           Sincerely,          MARILYN Lemus        CC: No Recipients

## 2022-12-22 LAB
FLUAV RNA RESP QL NAA+PROBE: NEGATIVE
FLUBV RNA RESP QL NAA+PROBE: NEGATIVE
SARS-COV-2 RNA RESP QL NAA+PROBE: NEGATIVE

## 2023-06-20 ENCOUNTER — OFFICE VISIT (OUTPATIENT)
Age: 28
End: 2023-06-20
Payer: COMMERCIAL

## 2023-06-20 VITALS
OXYGEN SATURATION: 99 % | TEMPERATURE: 98.5 F | RESPIRATION RATE: 18 BRPM | SYSTOLIC BLOOD PRESSURE: 104 MMHG | BODY MASS INDEX: 34.06 KG/M2 | HEIGHT: 67 IN | DIASTOLIC BLOOD PRESSURE: 66 MMHG | WEIGHT: 217 LBS | HEART RATE: 76 BPM

## 2023-06-20 DIAGNOSIS — Z00.00 ANNUAL PHYSICAL EXAM: Primary | ICD-10-CM

## 2023-06-20 DIAGNOSIS — M54.16 LUMBAR RADICULOPATHY: ICD-10-CM

## 2023-06-20 PROCEDURE — 99395 PREV VISIT EST AGE 18-39: CPT

## 2023-06-20 NOTE — PROGRESS NOTES
ADULT ANNUAL [address], [address] [facility] [place]    NAME: Adela Null  AGE: 33 y o  SEX: male  : 1995     DATE: 2023     Assessment and Plan:     Problem List Items Addressed This Visit        Nervous and Auditory    Lumbar radiculopathy  States used to have back pain, however he has not had any pain since he started exercising  Denies pain at this time  Other    BMI 33 0-33 9,adult  Recently lost about 30 pounds since February  States has been going to the gym every day, and also following a healthier diet  Other Visit Diagnoses     Annual physical exam    -  Primary  Presents for annual physical at this visit, denies any problems  No abnormalities noted during physical exam   Due for labs, already ordered, instructed to get labs done  Back pain better since exercising     Immunizations and preventive care screenings were discussed with patient today  Appropriate education was printed on patient's after visit summary  Counseling:  Alcohol/drug use: discussed moderation in alcohol intake, the recommendations for healthy alcohol use, and avoidance of illicit drug use  Dental Health: discussed importance of regular tooth brushing, flossing, and dental visits  Injury prevention: discussed safety/seat belts, safety helmets, smoke detectors, carbon dioxide detectors, and smoking near bedding or upholstery  Sexual health: discussed sexually transmitted diseases, partner selection, use of condoms, avoidance of unintended pregnancy, and contraceptive alternatives  Exercise: the importance of regular exercise/physical activity was discussed  Recommend exercise 3-5 times per week for at least 30 minutes  BMI Counseling: Body mass index is 33 99 kg/m²   The BMI is above normal  Nutrition recommendations include encouraging healthy choices of fruits and vegetables, decreasing fast food intake, limiting drinks that contain sugar, moderation in carbohydrate intake, reducing intake of saturated and trans fat and reducing intake of cholesterol  Exercise recommendations include exercising 3-5 times per week  No pharmacotherapy was ordered  Rationale for BMI follow-up plan is due to patient being overweight or obese  Depression Screening and Follow-up Plan: Patient was screened for depression during today's encounter  They screened negative with a PHQ-2 score of 0  Return in about 1 year (around 6/20/2024) for Annual physical      Chief Complaint:     Chief Complaint   Patient presents with   • Annual Exam      History of Present Illness:     Adult Annual Physical   Patient here for a comprehensive physical exam  The patient reports no problems  Needs to have physical completed for work  Since February lost about 30lbs  Denies having any problems at this visit  Diet and Physical Activity  Diet/Nutrition: well balanced diet and consuming 3-5 servings of fruits/vegetables daily  Exercise: 5-7 times a week on average and 30-60 minutes on average  Depression Screening  PHQ-2/9 Depression Screening    Little interest or pleasure in doing things: 0 - not at all  Feeling down, depressed, or hopeless: 0 - not at all  PHQ-2 Score: 0  PHQ-2 Interpretation: Negative depression screen       General Health  Sleep: sleeps well and gets 7-8 hours of sleep on average  Hearing: normal - bilateral   Vision: goes for regular eye exams, most recent eye exam <1 year ago and wears glasses  Dental: regular dental visits, brushes teeth twice daily and flosses teeth occasionally   Health  History of STDs?: no      Review of Systems:     Review of Systems   Constitutional: Negative for chills and fever  HENT: Negative for ear pain and sore throat  Eyes: Negative for pain and visual disturbance  Respiratory: Negative for cough and shortness of breath  Cardiovascular: Negative for chest pain and palpitations     Gastrointestinal: Negative for abdominal pain and vomiting  Genitourinary: Negative for dysuria and hematuria  Musculoskeletal: Negative for arthralgias and back pain  Skin: Negative for color change and rash  Neurological: Negative for seizures and syncope  Psychiatric/Behavioral: Negative for sleep disturbance  All other systems reviewed and are negative  Past Medical History:     History reviewed  No pertinent past medical history  Past Surgical History:     Past Surgical History:   Procedure Laterality Date   • WISDOM TOOTH EXTRACTION  2016    all 4       Social History:     Social History     Socioeconomic History   • Marital status: Single     Spouse name: None   • Number of children: None   • Years of education: None   • Highest education level: None   Occupational History   • None   Tobacco Use   • Smoking status: Never   • Smokeless tobacco: Never   Vaping Use   • Vaping Use: Never used   Substance and Sexual Activity   • Alcohol use:  Yes   • Drug use: Not Currently   • Sexual activity: Not Currently   Other Topics Concern   • None   Social History Narrative   • None     Social Determinants of Health     Financial Resource Strain: Not on file   Food Insecurity: Not on file   Transportation Needs: Not on file   Physical Activity: Inactive (3/7/2022)    Exercise Vital Sign    • Days of Exercise per Week: 0 days    • Minutes of Exercise per Session: 0 min   Stress: No Stress Concern Present (12/21/2022)    Ami[employer] Jhon Mahmood    • Feeling of Stress : Not at all   Social Connections: Not on file   Intimate Partner Violence: Not on file   Housing Stability: Not on file      Family History:     Family History   Problem Relation Age of Onset   • Thyroid disease Mother    • No Known Problems Father    • Asthma Brother       Current Medications:     Current Outpatient Medications   Medication Sig Dispense Refill   • Ascorbic Acid (Vitamin C) 500 MG CAPS Take "250 mg by mouth daily     • fexofenadine (ALLEGRA) 60 MG tablet Take 60 mg by mouth daily       No current facility-administered medications for this visit  Allergies: Allergies   Allergen Reactions   • Codeine       Physical Exam:     /66 (BP Location: Left arm, Patient Position: Sitting, Cuff Size: Large)   Pulse 76   Temp 98 5 °F (36 9 °C) (Tympanic)   Resp 18   Ht 5' 7\" (1 702 m)   Wt 98 4 kg (217 lb)   SpO2 99%   BMI 33 99 kg/m²     Physical Exam  Vitals and nursing note reviewed  Constitutional:       General: He is not in acute distress  Appearance: He is well-developed  HENT:      Head: Normocephalic and atraumatic  Eyes:      Conjunctiva/sclera: Conjunctivae normal    Cardiovascular:      Rate and Rhythm: Normal rate and regular rhythm  Heart sounds: No murmur heard  Pulmonary:      Effort: Pulmonary effort is normal  No respiratory distress  Breath sounds: Normal breath sounds  Abdominal:      Palpations: Abdomen is soft  Tenderness: There is no abdominal tenderness  Musculoskeletal:         General: No swelling  Cervical back: Neck supple  Skin:     General: Skin is warm and dry  Capillary Refill: Capillary refill takes less than 2 seconds  Neurological:      Mental Status: He is alert     Psychiatric:         Mood and Affect: Mood normal           MARILYN Casey   [facility]  "

## 2023-06-20 NOTE — LETTER
June 20, 2023     Patient: Wolfgang Sousa  YOB: 1995  Date of Visit: 6/20/2023      To Whom it May Concern:    Wolfgang Sousa is under my professional care  Julieta Chang was seen in my office on 6/20/2023  Julieta Chang had an annual physical exam completed, with no abnormalities  If you have any questions or concerns, please don't hesitate to call           Sincerely,          MARILYN Dawkins        CC: No Recipients

## 2023-06-21 ENCOUNTER — APPOINTMENT (OUTPATIENT)
Age: 28
End: 2023-06-21
Payer: COMMERCIAL

## 2023-06-21 DIAGNOSIS — Z00.00 ANNUAL PHYSICAL EXAM: ICD-10-CM

## 2023-06-21 LAB
ALBUMIN SERPL BCP-MCNC: 3.9 G/DL (ref 3.5–5)
ALP SERPL-CCNC: 66 U/L (ref 46–116)
ALT SERPL W P-5'-P-CCNC: 24 U/L (ref 12–78)
ANION GAP SERPL CALCULATED.3IONS-SCNC: 1 MMOL/L
AST SERPL W P-5'-P-CCNC: 17 U/L (ref 5–45)
BASOPHILS # BLD AUTO: 0.03 THOUSANDS/ÂΜL (ref 0–0.1)
BASOPHILS NFR BLD AUTO: 1 % (ref 0–1)
BILIRUB SERPL-MCNC: 0.63 MG/DL (ref 0.2–1)
BUN SERPL-MCNC: 13 MG/DL (ref 5–25)
CALCIUM SERPL-MCNC: 9 MG/DL (ref 8.3–10.1)
CHLORIDE SERPL-SCNC: 109 MMOL/L (ref 96–108)
CHOLEST SERPL-MCNC: 162 MG/DL
CO2 SERPL-SCNC: 28 MMOL/L (ref 21–32)
CREAT SERPL-MCNC: 0.81 MG/DL (ref 0.6–1.3)
EOSINOPHIL # BLD AUTO: 0.18 THOUSAND/ÂΜL (ref 0–0.61)
EOSINOPHIL NFR BLD AUTO: 3 % (ref 0–6)
ERYTHROCYTE [DISTWIDTH] IN BLOOD BY AUTOMATED COUNT: 12.1 % (ref 11.6–15.1)
EST. AVERAGE GLUCOSE BLD GHB EST-MCNC: 111 MG/DL
GFR SERPL CREATININE-BSD FRML MDRD: 121 ML/MIN/1.73SQ M
GLUCOSE P FAST SERPL-MCNC: 108 MG/DL (ref 65–99)
HBA1C MFR BLD: 5.5 %
HCT VFR BLD AUTO: 44.8 % (ref 36.5–49.3)
HDLC SERPL-MCNC: 34 MG/DL
HGB BLD-MCNC: 15.7 G/DL (ref 12–17)
IMM GRANULOCYTES # BLD AUTO: 0.02 THOUSAND/UL (ref 0–0.2)
IMM GRANULOCYTES NFR BLD AUTO: 0 % (ref 0–2)
LDLC SERPL CALC-MCNC: 84 MG/DL (ref 0–100)
LYMPHOCYTES # BLD AUTO: 2.3 THOUSANDS/ÂΜL (ref 0.6–4.47)
LYMPHOCYTES NFR BLD AUTO: 35 % (ref 14–44)
MCH RBC QN AUTO: 30.9 PG (ref 26.8–34.3)
MCHC RBC AUTO-ENTMCNC: 35 G/DL (ref 31.4–37.4)
MCV RBC AUTO: 88 FL (ref 82–98)
MONOCYTES # BLD AUTO: 0.45 THOUSAND/ÂΜL (ref 0.17–1.22)
MONOCYTES NFR BLD AUTO: 7 % (ref 4–12)
NEUTROPHILS # BLD AUTO: 3.67 THOUSANDS/ÂΜL (ref 1.85–7.62)
NEUTS SEG NFR BLD AUTO: 54 % (ref 43–75)
NRBC BLD AUTO-RTO: 0 /100 WBCS
PLATELET # BLD AUTO: 214 THOUSANDS/UL (ref 149–390)
PMV BLD AUTO: 12.1 FL (ref 8.9–12.7)
POTASSIUM SERPL-SCNC: 4 MMOL/L (ref 3.5–5.3)
PROT SERPL-MCNC: 7.4 G/DL (ref 6.4–8.4)
RBC # BLD AUTO: 5.08 MILLION/UL (ref 3.88–5.62)
SODIUM SERPL-SCNC: 138 MMOL/L (ref 135–147)
TRIGL SERPL-MCNC: 218 MG/DL
TSH SERPL DL<=0.05 MIU/L-ACNC: 1.27 UIU/ML (ref 0.45–4.5)
WBC # BLD AUTO: 6.65 THOUSAND/UL (ref 4.31–10.16)

## 2023-06-21 PROCEDURE — 36415 COLL VENOUS BLD VENIPUNCTURE: CPT

## 2023-06-21 PROCEDURE — 84443 ASSAY THYROID STIM HORMONE: CPT

## 2023-06-21 PROCEDURE — 83036 HEMOGLOBIN GLYCOSYLATED A1C: CPT

## 2023-06-21 PROCEDURE — 85025 COMPLETE CBC W/AUTO DIFF WBC: CPT

## 2023-06-21 PROCEDURE — 80053 COMPREHEN METABOLIC PANEL: CPT

## 2023-06-21 PROCEDURE — 80061 LIPID PANEL: CPT

## 2024-01-29 NOTE — PATIENT INSTRUCTIONS
Lumbar Radiculopathy   WHAT YOU NEED TO KNOW:   Lumbar radiculopathy is a painful condition that happens when a nerve in your lumbar spine (lower back) is pinched or irritated  Nerves control feeling and movement in your body  You may have numbness or pain that shoots down from your lower back towards your foot  DISCHARGE INSTRUCTIONS:   Medicines:   · Medicines:     ? NSAIDs , such as ibuprofen, help decrease swelling, pain, and fever  This medicine is available with or without a doctor's order  NSAIDs can cause stomach bleeding or kidney problems in certain people  If you take blood thinner medicine, always ask your healthcare provider if NSAIDs are safe for you  Always read the medicine label and follow directions  Muscle relaxers  help decrease pain and muscle spasms  ? Oral steroids: Steroids may also be given to reduce pain and swelling  ? Take your medicine as directed  Contact your healthcare provider if you think your medicine is not helping or if you have side effects  Tell him of her if you are allergic to any medicine  Keep a list of the medicines, vitamins, and herbs you take  Include the amounts, and when and why you take them  Bring the list or the pill bottles to follow-up visits  Carry your medicine list with you in case of an emergency  Follow up with your healthcare provider or spine specialist within 1 to 3 weeks:  After your first follow-up appointment, return to your healthcare provider or spine specialist every 2 weeks until you have healed  Ask for information about physical therapy for your condition  Write down your questions so you remember to ask them during your visits  Physical therapy:  You may need physical therapy to improve your condition  Your physical therapist may teach you certain exercises to improve posture (the way you stand and sit), flexibility, and strength in your lower back  Self care:   · Stay active:  It is best to be active when you have lumbar radiculopathy  Your physical therapist or healthcare provider may tell you to take walks to ease yourself back into your daily routine  Avoid long periods of bed rest  Bed rest could worsen your symptoms  Do not move in ways that increase your pain  Ask for more information about the best ways to stay active  · Use ice or heat packs:  Use ice or heat packs as directed on the sore area of your body to decrease the pain and swelling  Put ice in a plastic bag covered with a towel on your low back  Cover heated items with a towel to avoid burns  Use ice and heat as directed  · Avoid heavy lifting: Your condition may worsen if you lift heavy things  Avoid lifting if possible  · Maintain a healthy weight:  Excess body weight may strain your back  Talk with your healthcare provider about ways to lose excess weight if you are overweight  Contact your healthcare provider or spine specialist if:   · Your pain does not improve within 1 to 3 weeks after treatment  · Your pain and weakness keep you from your normal activities at work, home, or school  · You lose more than 10 pounds in 6 months without trying  · You become depressed or sad because of the pain  · You have questions or concerns about your condition or care  Return to the emergency department if:   · You have a fever greater than 100 4°F for longer than 2 days  · You have new, severe back or leg pain, or your pain spreads to both legs  · You have any new signs of numbness or weakness, especially in your lower back, legs, arms, or genital area  · You have new trouble controlling your urine and bowel movements  · You do not feel like your bladder empties when you urinate  © Copyright [address] Information is for End User's use only and may not be sold, redistributed or otherwise used for commercial purposes   All illustrations and images included in CareNotes® are the copyrighted property of A D A M , Inc  or [facility]  The above information is an  only  It is not intended as medical advice for individual conditions or treatments  Talk to your doctor, nurse or pharmacist before following any medical regimen to see if it is safe and effective for you  Lower Back Exercises   WHAT YOU NEED TO KNOW:   Lower back exercises help heal and strengthen your back muscles to prevent another injury  Ask your healthcare provider if you need to see a physical therapist for more advanced exercises  DISCHARGE INSTRUCTIONS:   Return to the emergency department if:   · You have severe pain that prevents you from moving  Contact your healthcare provider if:   · Your pain becomes worse  · You have new pain  · You have questions or concerns about your condition or care  Do lower back exercises safely:   · Do the exercises on a mat or firm surface  (not on a bed) to support your spine and prevent low back pain  · Move slowly and smoothly  Avoid fast or jerky motions  · Breathe normally  Do not hold your breath  · Stop if you feel pain  It is normal to feel some discomfort at first  Regular exercise will help decrease your discomfort over time  Lower back exercises: Your healthcare provider may recommend that you do back exercises 10 to 30 minutes each day  He may also recommend that you do exercises 1 to 3 times each day  Ask your healthcare provider which exercises are best for you and how often to do them  · Ankle pumps:  Lie on your back  Move your foot up (with your toes pointing toward your head)  Then, move your foot down (with your toes pointing away from you)  Repeat this exercise 10 times on each side  · Heel slides:  Lie on your back  Slowly bend one leg and then straighten it  Next, bend the other leg and then straighten it  Repeat 10 times on each side  · Pelvic tilt:  Lie on your back with your knees bent and feet flat on the floor   Place your arms in a relaxed position beside your body  Tighten the muscles of your abdomen and flatten your back against the floor  Hold for 5 seconds  Repeat 5 times  · Back stretch:  Lie on your back with your hands behind your head  Bend your knees and turn the lower half of your body to one side  Hold this position for 10 seconds  Repeat 3 times on each side  · Straight leg raises:  Lie on your back with one leg straight  Bend the other knee  Tighten your abdomen and then slowly lift the straight leg up about 6 to 12 inches off the floor  Hold for 1 to 5 seconds  Lower your leg slowly  Repeat 10 times on each leg  · Knee-to-chest:  Lie on your back with your knees bent and feet flat on the floor  Pull one of your knees toward your chest and hold it there for 5 seconds  Return your leg to the starting position  Lift the other knee toward your chest and hold for 5 seconds  Do this 5 times on each side  · Cat and camel:  Place your hands and knees on the floor  Arch your back upward toward the ceiling and lower your head  Round out your spine as much as you can  Hold for 5 seconds  Lift your head upward and push your chest downward toward the floor  Hold for 5 seconds  Do 3 sets or as directed  · Wall squats:  Stand with your back against a wall  Tighten the muscles of your abdomen  Slowly lower your body until your knees are bent at a 45 degree angle  Hold this position for 5 seconds  Slowly move back up to a standing position  Repeat 10 times  · Curl up:  Lie on your back with your knees bent and feet flat on the floor  Place your hands, palms down, underneath the curve in your lower back  Next, with your elbows on the floor, lift your shoulders and chest 2 to 3 inches  Keep your head in line with your shoulders  Hold this position for 5 seconds  When you can do this exercise without pain for 10 to 15 seconds, you may add a rotation   While your shoulders and chest are lifted off the ground, turn slightly to the left and hold  Repeat on the other side  · Bird dog:  Place your hands and knees on the floor  Keep your wrists directly below your shoulders and your knees directly below your hips  Pull your belly button in toward your spine  Do not flatten or arch your back  Tighten your abdominal muscles  Raise one arm straight out so that it is aligned with your head  Next, raise the leg opposite your arm  Hold this position for 15 seconds  Lower your arm and leg slowly and change sides  Do 5 sets  © Copyright [address] Information is for End User's use only and may not be sold, redistributed or otherwise used for commercial purposes  All illustrations and images included in CareNotes® are the copyrighted property of A D A M , Inc  or [address] [place], [place] [address]   The above information is an  only  It is not intended as medical advice for individual conditions or treatments  Talk to your doctor, nurse or pharmacist before following any medical regimen to see if it is safe and effective for you  [No Acute Distress] : no acute distress [Normal Oropharynx] : normal oropharynx [Normal Appearance] : normal appearance [No Neck Mass] : no neck mass [Normal Rate/Rhythm] : normal rate/rhythm [Normal S1, S2] : normal s1, s2 [No Murmurs] : no murmurs [No Resp Distress] : no resp distress [Clear to Auscultation Bilaterally] : clear to auscultation bilaterally [No Abnormalities] : no abnormalities [Benign] : benign [Normal Gait] : normal gait [No Clubbing] : no clubbing [No Cyanosis] : no cyanosis [No Edema] : no edema [FROM] : FROM [Normal Color/ Pigmentation] : normal color/ pigmentation [No Focal Deficits] : no focal deficits [Oriented x3] : oriented x3 [Normal Affect] : normal affect

## 2024-06-12 PROBLEM — H65.91 RIGHT NON-SUPPURATIVE OTITIS MEDIA: Status: RESOLVED | Noted: 2022-03-07 | Resolved: 2024-06-12

## 2024-06-12 PROBLEM — J02.9 PHARYNGITIS: Status: RESOLVED | Noted: 2022-03-07 | Resolved: 2024-06-12

## 2024-06-14 ENCOUNTER — OFFICE VISIT (OUTPATIENT)
Age: 29
End: 2024-06-14
Payer: COMMERCIAL

## 2024-06-14 VITALS
HEART RATE: 64 BPM | HEIGHT: 67 IN | WEIGHT: 217 LBS | BODY MASS INDEX: 34.06 KG/M2 | DIASTOLIC BLOOD PRESSURE: 62 MMHG | OXYGEN SATURATION: 99 % | SYSTOLIC BLOOD PRESSURE: 122 MMHG

## 2024-06-14 DIAGNOSIS — Z13.0 SCREENING FOR DEFICIENCY ANEMIA: ICD-10-CM

## 2024-06-14 DIAGNOSIS — R73.01 IMPAIRED FASTING GLUCOSE: ICD-10-CM

## 2024-06-14 DIAGNOSIS — Z11.59 NEED FOR HEPATITIS C SCREENING TEST: ICD-10-CM

## 2024-06-14 DIAGNOSIS — R53.83 OTHER FATIGUE: ICD-10-CM

## 2024-06-14 DIAGNOSIS — Z13.220 SCREENING FOR LIPID DISORDERS: ICD-10-CM

## 2024-06-14 DIAGNOSIS — Z11.4 SCREENING FOR HIV (HUMAN IMMUNODEFICIENCY VIRUS): ICD-10-CM

## 2024-06-14 DIAGNOSIS — Z00.00 ANNUAL PHYSICAL EXAM: Primary | ICD-10-CM

## 2024-06-14 PROCEDURE — 99395 PREV VISIT EST AGE 18-39: CPT

## 2024-06-14 NOTE — PROGRESS NOTES
INTERNAL MEDICINE FOLLOW-UP VISIT  [facility] - [facility] INTERNAL MEDICINE [address]    NAME: Denton Moses  AGE: 29 y.o. SEX: male  : 1995     DATE: 2024     Assessment and Plan:   1. Annual physical exam  He eats a well-balanced diet of fruits, veggies, and lean meats. He drinks an adequate amount of water, urinating pale to clear yellow every 2-3 hours. He goes to the gym 6 days/week. He sleeps well. He denies any tobacco or illicit drug use. He drinks tequila occasionally. He is UTD with dentist and eye doctor. He works at [employer]. He lives at home with his parents and is in the process of doing home renovations at    . BMI 33.0-33.9,adult  He has lost 30 pounds over last year, continue working on diet and exercising regularly.      Depression Screening and Follow-up Plan: Patient was screened for depression during today's encounter. They screened negative with a PHQ-2 score of 0.       No follow-ups on file.       Chief Complaint:     Chief Complaint   Patient presents with    Physical Exam      History of Present Illness:   Patient is a 30 yo male that presents today to establish care and for his annual physical exam. He has no new complaints today.    Health Maintenance:  Due for labs.   Family history of DM, asthma, celiac disease.    The following portions of the patient's history were reviewed and updated as appropriate: allergies, current medications, past family history, past medical history, past social history, past surgical history and problem list.     Review of Systems:     Review of Systems   Constitutional:  Negative for chills, fatigue and fever.   HENT:  Negative for ear discharge, ear pain, postnasal drip, rhinorrhea, sinus pressure, sinus pain, sore throat, tinnitus and trouble swallowing.    Eyes:  Negative for pain, discharge and itching.   Respiratory:  Negative for cough, shortness of breath and wheezing.    Cardiovascular:  Negative for chest pain,  "palpitations and leg swelling.   Gastrointestinal:  Negative for abdominal pain, constipation, diarrhea, nausea and vomiting.   Endocrine: Negative for polydipsia, polyphagia and polyuria.   Genitourinary:  Negative for difficulty urinating, frequency, hematuria and urgency.   Musculoskeletal:  Negative for arthralgias, joint swelling and myalgias.   Skin:  Negative for color change.   Allergic/Immunologic: Negative for environmental allergies.   Neurological:  Negative for dizziness, weakness, light-headedness, numbness and headaches.   Hematological:  Negative for adenopathy.   Psychiatric/Behavioral:  Negative for decreased concentration and sleep disturbance. The patient is not nervous/anxious.         Past Medical History:     Past Medical History:   Diagnosis Date    Pharyngitis 03/07/2022    Right non-suppurative otitis media 03/07/2022        Current Medications:     Current Outpatient Medications:     Ascorbic Acid (Vitamin C) 500 MG CAPS, Take 250 mg by mouth daily, Disp: , Rfl:     fexofenadine (ALLEGRA) 60 MG tablet, Take 60 mg by mouth daily, Disp: , Rfl:      Allergies:     Allergies   Allergen Reactions    Codeine         Physical Exam:     /62   Pulse 64   Ht 5' 7\" (1.702 m)   Wt 98.4 kg (217 lb)   SpO2 99%   BMI 33.99 kg/m²     Physical Exam  Vitals and nursing note reviewed.   Constitutional:       General: He is awake.      Appearance: Normal appearance. He is well-developed and well-groomed. He is obese.   HENT:      Head: Normocephalic and atraumatic.      Right Ear: Hearing and external ear normal.      Left Ear: Hearing and external ear normal.      Nose: Nose normal.      Mouth/Throat:      Lips: Pink.      Mouth: Mucous membranes are moist.   Eyes:      General: Lids are normal. Vision grossly intact. Gaze aligned appropriately.      Conjunctiva/sclera: Conjunctivae normal.   Neck:      Vascular: No carotid bruit.      Trachea: Trachea and phonation normal.   Cardiovascular:      " Rate and Rhythm: Normal rate and regular rhythm.      Heart sounds: Normal heart sounds, S1 normal and S2 normal. No murmur heard.     No friction rub. No gallop.   Pulmonary:      Effort: Pulmonary effort is normal.      Breath sounds: Normal breath sounds and air entry. No decreased breath sounds, wheezing, rhonchi or rales.   Abdominal:      General: Abdomen is protuberant.   Musculoskeletal:      Cervical back: Neck supple.      Right lower leg: No edema.      Left lower leg: No edema.   Skin:     General: Skin is warm.      Capillary Refill: Capillary refill takes less than 2 seconds.   Neurological:      Mental Status: He is alert.   Psychiatric:         Attention and Perception: Attention and perception normal.         Mood and Affect: Mood and affect normal.         Speech: Speech normal.         Behavior: Behavior normal. Behavior is cooperative.         Thought Content: Thought content normal.         Cognition and Memory: Cognition and memory normal.         Judgment: Judgment normal.           Data:     Laboratory Results: I have personally reviewed the pertinent laboratory results/reports   Radiology/Other Diagnostic Testing Results: I have personally reviewed pertinent reports.      Mandi Pollock PA-C  [facility] INTERNAL MEDICINE [address] [facility]

## 2024-10-13 ENCOUNTER — TELEPHONE (OUTPATIENT)
Age: 29
End: 2024-10-13

## 2024-10-14 ENCOUNTER — HOSPITAL ENCOUNTER (OUTPATIENT)
Dept: ULTRASOUND IMAGING | Facility: HOSPITAL | Age: 29
Discharge: HOME | End: 2024-10-14
Attending: INTERNAL MEDICINE
Payer: COMMERCIAL

## 2024-10-14 ENCOUNTER — APPOINTMENT (OUTPATIENT)
Dept: LAB | Facility: HOSPITAL | Age: 29
End: 2024-10-14
Payer: COMMERCIAL

## 2024-10-14 ENCOUNTER — OFFICE VISIT (OUTPATIENT)
Age: 29
End: 2024-10-14
Payer: COMMERCIAL

## 2024-10-14 VITALS
DIASTOLIC BLOOD PRESSURE: 62 MMHG | BODY MASS INDEX: 32.49 KG/M2 | RESPIRATION RATE: 16 BRPM | HEIGHT: 67 IN | WEIGHT: 207 LBS | HEART RATE: 66 BPM | OXYGEN SATURATION: 98 % | SYSTOLIC BLOOD PRESSURE: 120 MMHG

## 2024-10-14 DIAGNOSIS — N45.3 EPIDIDYMO-ORCHITIS: Primary | ICD-10-CM

## 2024-10-14 DIAGNOSIS — N45.3 EPIDIDYMO-ORCHITIS: ICD-10-CM

## 2024-10-14 PROCEDURE — 87591 N.GONORRHOEAE DNA AMP PROB: CPT

## 2024-10-14 PROCEDURE — 76870 US EXAM SCROTUM: CPT

## 2024-10-14 PROCEDURE — 99213 OFFICE O/P EST LOW 20 MIN: CPT | Performed by: INTERNAL MEDICINE

## 2024-10-14 PROCEDURE — 87491 CHLMYD TRACH DNA AMP PROBE: CPT

## 2024-10-14 RX ORDER — LEVOFLOXACIN 500 MG/1
500 TABLET, FILM COATED ORAL DAILY
Qty: 10 TABLET | Refills: 0 | Status: SHIPPED | OUTPATIENT
Start: 2024-10-14 | End: 2024-10-17

## 2024-10-14 NOTE — PATIENT INSTRUCTIONS
Presumed epididymoorchitis of unclear etiology-check urinalysis/culture, GC/chlamydia, scrotal ultrasound and treat empirically with Levaquin 500 mg daily x 10.  Give urine specimen prior to starting the Levaquin.

## 2024-10-14 NOTE — PROGRESS NOTES
Assessment/Plan:    Diagnoses and all orders for this visit:    Epididymo-orchitis  -     US scrotum and testicles; Future  -     levofloxacin (LEVAQUIN) 500 mg tablet; Take 1 tablet (500 mg total) by mouth daily for 10 days  -     Chlamydia/GC amplified DNA by PCR; Future  -     UA w Reflex to Microscopic w Reflex to Culture; Future              Patient Instructions   Presumed epididymoorchitis of unclear etiology-check urinalysis/culture, GC/chlamydia, scrotal ultrasound and treat empirically with Levaquin 500 mg daily x 10.  Give urine specimen prior to starting the Levaquin.    Subjective:      Patient ID: Denton Moses is a 29 y.o. male    Patient presents acutely with complaint of pain in left testicle.  He was at work on Friday night which was 3 days ago and he had been climbing a ladder and twisting when he noted a pain in the suprapubic area.  The following day the pain seemed to radiate down and reside in the left testicle.  At worst it was a 3-4 out of 10 and currently it is a 1 out of 10.  There is been no fevers, chills, dysuria or change in urinary frequency.  There is been no recent sexual encounter.  No prior history of similar pain.  No urethral discharge.  No injury to the lower abdomen or groin region.    Testicle Pain  He complains of testicular pain. Pertinent negatives include no abdominal pain, chest pain, chills, coughing, dysuria, fever, frequency, headaches, rash, shortness of breath or urgency.         Current Outpatient Medications:     Ascorbic Acid (Vitamin C) 500 MG CAPS, Take 250 mg by mouth daily, Disp: , Rfl:     fexofenadine (ALLEGRA) 60 MG tablet, Take 60 mg by mouth daily, Disp: , Rfl:     levofloxacin (LEVAQUIN) 500 mg tablet, Take 1 tablet (500 mg total) by mouth daily for 10 days, Disp: 10 tablet, Rfl: 0    No results found for this or any previous visit (from the past 1008 hour(s)).    The following portions of the patient's history were reviewed and updated as  appropriate: allergies, current medications, past family history, past medical history, past social history, past surgical history and problem list.     Review of Systems   Constitutional:  Negative for appetite change, chills, diaphoresis, fatigue, fever and unexpected weight change.   HENT:  Negative for congestion, hearing loss and rhinorrhea.    Eyes:  Negative for visual disturbance.   Respiratory:  Negative for cough, chest tightness, shortness of breath and wheezing.    Cardiovascular:  Negative for chest pain, palpitations and leg swelling.   Gastrointestinal:  Negative for abdominal pain and blood in stool.   Endocrine: Negative for cold intolerance, heat intolerance, polydipsia and polyuria.   Genitourinary:  Positive for testicular pain. Negative for difficulty urinating, dysuria, frequency and urgency.   Musculoskeletal:  Negative for arthralgias and myalgias.   Skin:  Negative for rash.   Neurological:  Negative for dizziness, weakness, light-headedness and headaches.   Hematological:  Does not bruise/bleed easily.   Psychiatric/Behavioral:  Negative for dysphoric mood and sleep disturbance.          Objective:      Vitals:    10/14/24 1324   BP: 120/62   Pulse: 66   Resp: 16   SpO2: 98%          Physical Exam  Constitutional:       Appearance: He is well-developed.   HENT:      Head: Normocephalic and atraumatic.   Pulmonary:      Effort: Pulmonary effort is normal.   Genitourinary:     Comments: Normal circumcised male phallus, tender to palpate left epididymis, no inguinal hernia, right testicular exam normal  Neurological:      Mental Status: He is alert and oriented to person, place, and time.   Psychiatric:         Behavior: Behavior normal. Behavior is cooperative.         Thought Content: Thought content normal.         Judgment: Judgment normal.

## 2024-10-15 ENCOUNTER — TELEPHONE (OUTPATIENT)
Dept: UROLOGY | Facility: CLINIC | Age: 29
End: 2024-10-15

## 2024-10-15 ENCOUNTER — TELEPHONE (OUTPATIENT)
Age: 29
End: 2024-10-15

## 2024-10-15 DIAGNOSIS — N50.89 TESTICULAR MASS: Primary | ICD-10-CM

## 2024-10-15 LAB
C TRACH DNA SPEC QL NAA+PROBE: NEGATIVE
N GONORRHOEA DNA SPEC QL NAA+PROBE: NEGATIVE

## 2024-10-15 NOTE — H&P (VIEW-ONLY)
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  Risk of deep venous thrombosis and venous thromboembolism as well as pneumonia and other potential but unforseeable or unpredictable complications was also discussed with the patient.    We discussed testicular prosthesis placement PRN in the future as well as testosterone levels and fertility after orchiectomy today    Return for left radical inguinal orchiectomy with the first available MD      Orders:    Ambulatory Referral to Urology    Case request operating room: ORCHIECTOMY INGUINAL; Standing    Case request operating room: ORCHIECTOMY INGUINAL    HCG, tumor marker; Future    AFP tumor marker; Future    CBC and Platelet; Future    Basic metabolic panel; Future    BMI 33.0-33.9,adult  Prominent tissue in the escucheon, this can increase chance of wound infection and poor healing               History of Present Illness     Denton Moses is a 29 y.o. male who presents with testicular pain, left then right, STI testing negative. Imaging with a left testicular mass. No FH testicular  "cancer    Referred in consultation by Channing Silva MD. Today's consultation sent to him.    The following portions of the patient's history were reviewed and updated as appropriate: allergies, current medications, past family history, past medical history, past social history, past surgical history and problem list.      History obtained from : patient  Review of Systems   Constitutional: Negative.    HENT: Negative.     Eyes: Negative.    Respiratory: Negative.     Cardiovascular: Negative.    Gastrointestinal: Negative.    Endocrine: Negative.    Genitourinary:  Positive for scrotal swelling and testicular pain.   Musculoskeletal: Negative.    Skin: Negative.    Allergic/Immunologic: Negative.    Neurological: Negative.    Hematological: Negative.    Psychiatric/Behavioral: Negative.               Objective     /66 (BP Location: Left arm, Patient Position: Sitting, Cuff Size: Large)   Pulse 70   Ht 5' 7\" (1.702 m)   Wt 93.4 kg (206 lb)   SpO2 98%   BMI 32.26 kg/m²   Physical Exam  Vitals reviewed.   Constitutional:       General: He is not in acute distress.     Appearance: Normal appearance. He is well-developed. He is not ill-appearing, toxic-appearing or diaphoretic.   HENT:      Head: Normocephalic and atraumatic.      Nose: Nose normal.      Mouth/Throat:      Mouth: Mucous membranes are moist.   Eyes:      General: No scleral icterus.        Right eye: No discharge.         Left eye: No discharge.   Neck:      Thyroid: No thyromegaly.      Trachea: No tracheal deviation.   Pulmonary:      Effort: Pulmonary effort is normal.   Chest:      Chest wall: No tenderness.   Abdominal:      General: There is no distension.      Palpations: There is no mass.      Tenderness: There is no abdominal tenderness. There is no guarding.      Hernia: No hernia is present.   Musculoskeletal:         General: No deformity or signs of injury. Normal range of motion.      Cervical back: Normal range of motion and " "neck supple.   Lymphadenopathy:      Cervical: No cervical adenopathy.   Skin:     General: Skin is dry.      Coloration: Skin is not jaundiced or pale.      Findings: No erythema.   Neurological:      Mental Status: He is alert and oriented to person, place, and time.      Cranial Nerves: No cranial nerve deficit.      Motor: No abnormal muscle tone.      Coordination: Coordination normal.   Psychiatric:         Mood and Affect: Mood normal.         Behavior: Behavior normal.         Thought Content: Thought content normal.         Judgment: Judgment normal.       Results  No results found for: \"PSA\"  Lab Results   Component Value Date    CALCIUM 9.0 06/21/2023    K 4.0 06/21/2023    CO2 28 06/21/2023     (H) 06/21/2023    BUN 13 06/21/2023    CREATININE 0.81 06/21/2023     Lab Results   Component Value Date    WBC 6.65 06/21/2023    HGB 15.7 06/21/2023    HCT 44.8 06/21/2023    MCV 88 06/21/2023     06/21/2023       Office Urine Dip  No results found for this or any previous visit (from the past 1 hour(s)).]    Administrative Statements         "

## 2024-10-15 NOTE — PROGRESS NOTES
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 seminoma or non seminoma testicular cancer  I discussed these disease processes with him  Decision for major urologic surgery without risk factors  Independent review of imaging today using PACS with the patient (normal right testis, left testicular mass noted)  External notes reviewed (negative for urologic history)  Understands the risks of infection, bleeding, pain, damage to surrounding structures, need for additional procedures, risk of failure of the procedure, risk of anesthesia, risk of positioning complications including neurapraxia, chronic pain, and paralysis as well as risk of rhabdomyolysis and compartment syndrome.  Risk of deep venous thrombosis and venous thromboembolism as well as pneumonia and other potential but unforseeable or unpredictable complications was also discussed with the patient.    We discussed testicular prosthesis placement PRN in the future as well as testosterone levels and fertility after orchiectomy today    Return for left radical inguinal orchiectomy with the first available MD      Orders:    Ambulatory Referral to Urology    Case request operating room: ORCHIECTOMY INGUINAL; Standing    Case request operating room: ORCHIECTOMY INGUINAL    HCG, tumor marker; Future    AFP tumor marker; Future    CBC and Platelet; Future    Basic metabolic panel; Future    BMI 33.0-33.9,adult  Prominent tissue in the escucheon, this can increase chance of wound infection and poor healing               History of Present Illness     Denton Moses is a 29 y.o. male who presents with testicular pain, left then right, STI testing negative. Imaging with a left testicular mass. No FH testicular  "cancer    Referred in consultation by Channing Silva MD. Today's consultation sent to him.    The following portions of the patient's history were reviewed and updated as appropriate: allergies, current medications, past family history, past medical history, past social history, past surgical history and problem list.      History obtained from : patient  Review of Systems   Constitutional: Negative.    HENT: Negative.     Eyes: Negative.    Respiratory: Negative.     Cardiovascular: Negative.    Gastrointestinal: Negative.    Endocrine: Negative.    Genitourinary:  Positive for scrotal swelling and testicular pain.   Musculoskeletal: Negative.    Skin: Negative.    Allergic/Immunologic: Negative.    Neurological: Negative.    Hematological: Negative.    Psychiatric/Behavioral: Negative.               Objective     /66 (BP Location: Left arm, Patient Position: Sitting, Cuff Size: Large)   Pulse 70   Ht 5' 7\" (1.702 m)   Wt 93.4 kg (206 lb)   SpO2 98%   BMI 32.26 kg/m²   Physical Exam  Vitals reviewed.   Constitutional:       General: He is not in acute distress.     Appearance: Normal appearance. He is well-developed. He is not ill-appearing, toxic-appearing or diaphoretic.   HENT:      Head: Normocephalic and atraumatic.      Nose: Nose normal.      Mouth/Throat:      Mouth: Mucous membranes are moist.   Eyes:      General: No scleral icterus.        Right eye: No discharge.         Left eye: No discharge.   Neck:      Thyroid: No thyromegaly.      Trachea: No tracheal deviation.   Pulmonary:      Effort: Pulmonary effort is normal.   Chest:      Chest wall: No tenderness.   Abdominal:      General: There is no distension.      Palpations: There is no mass.      Tenderness: There is no abdominal tenderness. There is no guarding.      Hernia: No hernia is present.   Musculoskeletal:         General: No deformity or signs of injury. Normal range of motion.      Cervical back: Normal range of motion and " "neck supple.   Lymphadenopathy:      Cervical: No cervical adenopathy.   Skin:     General: Skin is dry.      Coloration: Skin is not jaundiced or pale.      Findings: No erythema.   Neurological:      Mental Status: He is alert and oriented to person, place, and time.      Cranial Nerves: No cranial nerve deficit.      Motor: No abnormal muscle tone.      Coordination: Coordination normal.   Psychiatric:         Mood and Affect: Mood normal.         Behavior: Behavior normal.         Thought Content: Thought content normal.         Judgment: Judgment normal.       Results  No results found for: \"PSA\"  Lab Results   Component Value Date    CALCIUM 9.0 06/21/2023    K 4.0 06/21/2023    CO2 28 06/21/2023     (H) 06/21/2023    BUN 13 06/21/2023    CREATININE 0.81 06/21/2023     Lab Results   Component Value Date    WBC 6.65 06/21/2023    HGB 15.7 06/21/2023    HCT 44.8 06/21/2023    MCV 88 06/21/2023     06/21/2023       Office Urine Dip  No results found for this or any previous visit (from the past 1 hour(s)).]    Administrative Statements         "

## 2024-10-15 NOTE — TELEPHONE ENCOUNTER
Jeff Kennedy,   10/15/2024  9:41 AM EDT Back to Top      Thanks for reaching out Jame     Team: Can we get this patient in for urgent consult in the office? I know I do not have any office days the rest of the week. He may need to travel if we can get him in to see someone at one of the other campuses before the end of the week. Otherwise I would be happy to see him next week,      Channing Silva MD  10/15/2024  9:37 AM EDT       FYI-I just met Kj yesterday, 3 days of suprapubic pain that migrated down to his testicle.  I thought it was epididymitis, unfortunately not.  I spoke with him this morning and put in an ASAP referral to you.

## 2024-10-16 ENCOUNTER — APPOINTMENT (OUTPATIENT)
Dept: LAB | Facility: HOSPITAL | Age: 29
End: 2024-10-16
Payer: COMMERCIAL

## 2024-10-16 ENCOUNTER — OFFICE VISIT (OUTPATIENT)
Dept: UROLOGY | Facility: CLINIC | Age: 29
End: 2024-10-16
Payer: COMMERCIAL

## 2024-10-16 ENCOUNTER — TELEPHONE (OUTPATIENT)
Dept: UROLOGY | Facility: CLINIC | Age: 29
End: 2024-10-16

## 2024-10-16 VITALS
HEART RATE: 70 BPM | HEIGHT: 67 IN | SYSTOLIC BLOOD PRESSURE: 124 MMHG | BODY MASS INDEX: 32.33 KG/M2 | DIASTOLIC BLOOD PRESSURE: 66 MMHG | OXYGEN SATURATION: 98 % | WEIGHT: 206 LBS

## 2024-10-16 DIAGNOSIS — N50.89 TESTICULAR MASS: ICD-10-CM

## 2024-10-16 LAB
AFP-TM SERPL-MCNC: 1.88 NG/ML (ref 0–9)
ANION GAP SERPL CALCULATED.3IONS-SCNC: 7 MMOL/L (ref 4–13)
BUN SERPL-MCNC: 14 MG/DL (ref 5–25)
CALCIUM SERPL-MCNC: 9.5 MG/DL (ref 8.4–10.2)
CHLORIDE SERPL-SCNC: 104 MMOL/L (ref 96–108)
CO2 SERPL-SCNC: 26 MMOL/L (ref 21–32)
CREAT SERPL-MCNC: 0.91 MG/DL (ref 0.6–1.3)
ERYTHROCYTE [DISTWIDTH] IN BLOOD BY AUTOMATED COUNT: 12.2 % (ref 11.6–15.1)
GFR SERPL CREATININE-BSD FRML MDRD: 114 ML/MIN/1.73SQ M
GLUCOSE SERPL-MCNC: 98 MG/DL (ref 65–140)
HCG-TM SERPL-SCNC: <0.6 MLU/ML
HCT VFR BLD AUTO: 43.5 % (ref 36.5–49.3)
HGB BLD-MCNC: 15.1 G/DL (ref 12–17)
MCH RBC QN AUTO: 30.4 PG (ref 26.8–34.3)
MCHC RBC AUTO-ENTMCNC: 34.7 G/DL (ref 31.4–37.4)
MCV RBC AUTO: 88 FL (ref 82–98)
PLATELET # BLD AUTO: 247 THOUSANDS/UL (ref 149–390)
PMV BLD AUTO: 10.6 FL (ref 8.9–12.7)
POTASSIUM SERPL-SCNC: 3.8 MMOL/L (ref 3.5–5.3)
RBC # BLD AUTO: 4.97 MILLION/UL (ref 3.88–5.62)
SODIUM SERPL-SCNC: 137 MMOL/L (ref 135–147)
WBC # BLD AUTO: 8.2 THOUSAND/UL (ref 4.31–10.16)

## 2024-10-16 PROCEDURE — 99204 OFFICE O/P NEW MOD 45 MIN: CPT | Performed by: UROLOGY

## 2024-10-16 PROCEDURE — 36415 COLL VENOUS BLD VENIPUNCTURE: CPT

## 2024-10-16 PROCEDURE — 82105 ALPHA-FETOPROTEIN SERUM: CPT

## 2024-10-16 PROCEDURE — 85027 COMPLETE CBC AUTOMATED: CPT

## 2024-10-16 PROCEDURE — 80048 BASIC METABOLIC PNL TOTAL CA: CPT

## 2024-10-16 PROCEDURE — 84702 CHORIONIC GONADOTROPIN TEST: CPT

## 2024-10-16 RX ORDER — ACETAMINOPHEN 325 MG/1
975 TABLET ORAL ONCE
Status: CANCELLED | OUTPATIENT
Start: 2024-10-16 | End: 2024-10-16

## 2024-10-16 RX ORDER — IBUPROFEN 200 MG
TABLET ORAL EVERY 6 HOURS PRN
COMMUNITY
End: 2024-11-15

## 2024-10-16 RX ORDER — ACETAMINOPHEN 500 MG
500 TABLET ORAL EVERY 6 HOURS PRN
COMMUNITY
End: 2024-11-15

## 2024-10-16 NOTE — ASSESSMENT & PLAN NOTE
Prominent tissue in the escucheon, this can increase chance of wound infection and poor healing

## 2024-10-16 NOTE — ASSESSMENT & PLAN NOTE
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 seminoma or non seminoma testicular cancer  I discussed these disease processes with him  Decision for major urologic surgery without risk factors  Independent review of imaging today using PACS with the patient (normal right testis, left testicular mass noted)  External notes reviewed (negative for urologic history)  Understands the risks of infection, bleeding, pain, damage to surrounding structures, need for additional procedures, risk of failure of the procedure, risk of anesthesia, risk of positioning complications including neurapraxia, chronic pain, and paralysis as well as risk of rhabdomyolysis and compartment syndrome.  Risk of deep venous thrombosis and venous thromboembolism as well as pneumonia and other potential but unforseeable or unpredictable complications was also discussed with the patient.    We discussed testicular prosthesis placement PRN in the future as well as testosterone levels and fertility after orchiectomy today    Return for left radical inguinal orchiectomy with the first available MD      Orders:    Ambulatory Referral to Urology    Case request operating room: ORCHIECTOMY INGUINAL; Standing    Case request operating room: ORCHIECTOMY INGUINAL    HCG, tumor marker; Future    AFP tumor marker; Future    CBC and Platelet; Future    Basic metabolic panel; Future

## 2024-10-16 NOTE — TELEPHONE ENCOUNTER
Spoke with patient and confirmed surgery date of:10/17/2024  Type of surgery:LEFT ORCHIECTOMY  Operating physician: our office/[facility]  Location of surgery: [facility]    Verbally went over prep with patient on: 10/16/2024  NPO  Bowel prep? No  Hospital calls afternoon prior with arrival time -Calls Friday afternoon for Monday surgeries  Patient needs ride to and from surgery (outpatient/inpatient)   Pre-op testing to be done 2 weeks prior to surgery/LABS ORDERED FOR 10/16/2024  (list labs needed)  Blood thinners:   List blood thinner/how long needs to held or no hold needed  Clearances needed: (Medical/Cardiac/None)    Mailed/emailed to patient on:EMAILED TO PT 10/16/2024  Copy of packet scanned into Media on:10/16/2024  Labs in packet  Soap / Bowel prep in packet  Pre-op & Post-op in packet  Dates of H&P and post-op if needed    Consent: in Media or on admit  If needed:SCANNED TO CHART 10/16/2024    Medical/Cardiac Clearance:  Appt with:  Appt date and time:  Date clearance form faxed:  Best fax number:    Medication Suspension of: Medication Name / how many days  Ordering provider:  Faxed Medication Suspension form on:  Best fax number:    [place]:  Faxed form to pharmacy on:    RBC blood bank done on:    Rep info:  Emailed rep on date:

## 2024-10-16 NOTE — PRE-PROCEDURE INSTRUCTIONS
Pre-Surgery Instructions:   Medication Instructions    acetaminophen (TYLENOL) 500 mg tablet Uses PRN- OK to take day of surgery    fexofenadine (ALLEGRA) 60 MG tablet Take day of surgery.    ibuprofen (MOTRIN) 200 mg tablet Pt stopped 3 days ago   Medication instructions for day surgery reviewed. Please use only a sip of water to take your instructed medications. Avoid all over the counter vitamins, supplements and NSAIDS for one week prior to surgery per anesthesia guidelines. Tylenol is ok to take as needed.     You will receive a call one business day prior to surgery with an arrival time and hospital directions. If your surgery is scheduled on a Monday, the hospital will be calling you on the Friday prior to your surgery. If you have not heard from anyone by 8pm, please call the hospital supervisor through the hospital  at [phone]. ([place] [phone] or [place] [phone]).    Do not eat or drink anything after midnight the night before your surgery, including candy, mints, lifesavers, or chewing gum. Do not drink alcohol 24hrs before your surgery. Try not to smoke at least 24hrs before your surgery.       Follow the pre surgery showering instructions as listed in the “My Surgical Experience Booklet” or otherwise provided by your surgeon's office. Do not use a blade to shave the surgical area 1 week before surgery. It is okay to use a clean electric clippers up to 24 hours before surgery. Do not apply any lotions, creams, including makeup, cologne, deodorant, or perfumes after showering on the day of your surgery. Do not use dry shampoo, hair spray, hair gel, or any type of hair products.     No contact lenses, eye make-up, or artificial eyelashes. Remove nail polish, including gel polish, and any artificial, gel, or acrylic nails if possible. Remove all jewelry including rings and body piercing jewelry.     Wear causal clothing that is easy to take on and off. Consider your type of  surgery.    Keep any valuables, jewelry, piercings at home. Please bring any specially ordered equipment (sling, braces) if indicated.    Arrange for a responsible person to drive you to and from the hospital on the day of your surgery. Please confirm the visitor policy for the day of your procedure when you receive your phone call with an arrival time.     Call the surgeon's office with any new illnesses, exposures, or additional questions prior to surgery.    Please reference your “My Surgical Experience Booklet” for additional information to prepare for your upcoming surgery.

## 2024-10-17 ENCOUNTER — HOSPITAL ENCOUNTER (OUTPATIENT)
Facility: HOSPITAL | Age: 29
Setting detail: OUTPATIENT SURGERY
Discharge: HOME | End: 2024-10-17
Payer: COMMERCIAL

## 2024-10-17 ENCOUNTER — TELEPHONE (OUTPATIENT)
Dept: UROLOGY | Facility: AMBULATORY SURGERY CENTER | Age: 29
End: 2024-10-17

## 2024-10-17 ENCOUNTER — ANESTHESIA (OUTPATIENT)
Dept: PERIOP | Facility: HOSPITAL | Age: 29
End: 2024-10-17
Payer: COMMERCIAL

## 2024-10-17 ENCOUNTER — ANESTHESIA EVENT (OUTPATIENT)
Dept: PERIOP | Facility: HOSPITAL | Age: 29
End: 2024-10-17
Payer: COMMERCIAL

## 2024-10-17 VITALS
DIASTOLIC BLOOD PRESSURE: 57 MMHG | SYSTOLIC BLOOD PRESSURE: 123 MMHG | WEIGHT: 208 LBS | RESPIRATION RATE: 16 BRPM | TEMPERATURE: 97.6 F | OXYGEN SATURATION: 99 % | BODY MASS INDEX: 32.65 KG/M2 | HEIGHT: 67 IN | HEART RATE: 73 BPM

## 2024-10-17 DIAGNOSIS — N50.89 TESTICULAR MASS: Primary | ICD-10-CM

## 2024-10-17 LAB — GLUCOSE SERPL-MCNC: 222 MG/DL (ref 65–140)

## 2024-10-17 PROCEDURE — 88309 TISSUE EXAM BY PATHOLOGIST: CPT | Performed by: PATHOLOGY

## 2024-10-17 PROCEDURE — 54530 REMOVAL OF TESTIS: CPT

## 2024-10-17 PROCEDURE — 88342 IMHCHEM/IMCYTCHM 1ST ANTB: CPT | Performed by: PATHOLOGY

## 2024-10-17 PROCEDURE — 82948 REAGENT STRIP/BLOOD GLUCOSE: CPT

## 2024-10-17 RX ORDER — PROMETHAZINE HYDROCHLORIDE 25 MG/ML
12.5 INJECTION, SOLUTION INTRAMUSCULAR; INTRAVENOUS ONCE
Status: DISCONTINUED | OUTPATIENT
Start: 2024-10-17 | End: 2024-10-17

## 2024-10-17 RX ORDER — ACETAMINOPHEN 325 MG/1
975 TABLET ORAL ONCE
Status: DISCONTINUED | OUTPATIENT
Start: 2024-10-17 | End: 2024-10-17 | Stop reason: HOSPADM

## 2024-10-17 RX ORDER — ACETAMINOPHEN 500 MG
1000 TABLET ORAL EVERY 6 HOURS
Qty: 40 TABLET | Refills: 0 | Status: SHIPPED | OUTPATIENT
Start: 2024-10-17 | End: 2024-10-22

## 2024-10-17 RX ORDER — PROMETHAZINE HYDROCHLORIDE 25 MG/ML
12.5 INJECTION, SOLUTION INTRAMUSCULAR; INTRAVENOUS ONCE
Status: COMPLETED | OUTPATIENT
Start: 2024-10-17 | End: 2024-10-17

## 2024-10-17 RX ORDER — DEXAMETHASONE SODIUM PHOSPHATE 10 MG/ML
INJECTION, SOLUTION INTRAMUSCULAR; INTRAVENOUS AS NEEDED
Status: DISCONTINUED | OUTPATIENT
Start: 2024-10-17 | End: 2024-10-17

## 2024-10-17 RX ORDER — IBUPROFEN 200 MG
600 TABLET ORAL EVERY 6 HOURS SCHEDULED
Qty: 60 TABLET
Start: 2024-10-17 | End: 2024-11-15

## 2024-10-17 RX ORDER — HYDROMORPHONE HCL/PF 1 MG/ML
0.4 SYRINGE (ML) INJECTION
Status: DISCONTINUED | OUTPATIENT
Start: 2024-10-17 | End: 2024-10-17 | Stop reason: HOSPADM

## 2024-10-17 RX ORDER — FLUTICASONE PROPIONATE 50 MCG
1 SPRAY, SUSPENSION (ML) NASAL DAILY
COMMUNITY

## 2024-10-17 RX ORDER — SODIUM CHLORIDE, SODIUM LACTATE, POTASSIUM CHLORIDE, CALCIUM CHLORIDE 600; 310; 30; 20 MG/100ML; MG/100ML; MG/100ML; MG/100ML
INJECTION, SOLUTION INTRAVENOUS CONTINUOUS PRN
Status: DISCONTINUED | OUTPATIENT
Start: 2024-10-17 | End: 2024-10-17

## 2024-10-17 RX ORDER — ONDANSETRON 2 MG/ML
INJECTION INTRAMUSCULAR; INTRAVENOUS AS NEEDED
Status: DISCONTINUED | OUTPATIENT
Start: 2024-10-17 | End: 2024-10-17

## 2024-10-17 RX ORDER — FENTANYL CITRATE 50 UG/ML
INJECTION, SOLUTION INTRAMUSCULAR; INTRAVENOUS AS NEEDED
Status: DISCONTINUED | OUTPATIENT
Start: 2024-10-17 | End: 2024-10-17

## 2024-10-17 RX ORDER — PROPOFOL 10 MG/ML
INJECTION, EMULSION INTRAVENOUS AS NEEDED
Status: DISCONTINUED | OUTPATIENT
Start: 2024-10-17 | End: 2024-10-17

## 2024-10-17 RX ORDER — HYDROMORPHONE HCL/PF 1 MG/ML
SYRINGE (ML) INJECTION AS NEEDED
Status: DISCONTINUED | OUTPATIENT
Start: 2024-10-17 | End: 2024-10-17

## 2024-10-17 RX ORDER — BUPIVACAINE HYDROCHLORIDE AND EPINEPHRINE 5; 5 MG/ML; UG/ML
INJECTION, SOLUTION EPIDURAL; INTRACAUDAL; PERINEURAL AS NEEDED
Status: DISCONTINUED | OUTPATIENT
Start: 2024-10-17 | End: 2024-10-17 | Stop reason: HOSPADM

## 2024-10-17 RX ORDER — LIDOCAINE HYDROCHLORIDE 10 MG/ML
INJECTION, SOLUTION EPIDURAL; INFILTRATION; INTRACAUDAL; PERINEURAL AS NEEDED
Status: DISCONTINUED | OUTPATIENT
Start: 2024-10-17 | End: 2024-10-17

## 2024-10-17 RX ORDER — ONDANSETRON 2 MG/ML
4 INJECTION INTRAMUSCULAR; INTRAVENOUS ONCE
Status: COMPLETED | OUTPATIENT
Start: 2024-10-17 | End: 2024-10-17

## 2024-10-17 RX ORDER — KETOROLAC TROMETHAMINE 30 MG/ML
INJECTION, SOLUTION INTRAMUSCULAR; INTRAVENOUS AS NEEDED
Status: DISCONTINUED | OUTPATIENT
Start: 2024-10-17 | End: 2024-10-17

## 2024-10-17 RX ORDER — MAGNESIUM HYDROXIDE 1200 MG/15ML
LIQUID ORAL AS NEEDED
Status: DISCONTINUED | OUTPATIENT
Start: 2024-10-17 | End: 2024-10-17 | Stop reason: HOSPADM

## 2024-10-17 RX ORDER — CEFAZOLIN SODIUM 2 G/50ML
2000 SOLUTION INTRAVENOUS ONCE
Status: COMPLETED | OUTPATIENT
Start: 2024-10-17 | End: 2024-10-17

## 2024-10-17 RX ADMIN — DEXMEDETOMIDINE HYDROCHLORIDE 12 MCG: 100 INJECTION, SOLUTION, CONCENTRATE INTRAVENOUS at 09:47

## 2024-10-17 RX ADMIN — FENTANYL CITRATE 50 MCG: 50 INJECTION INTRAMUSCULAR; INTRAVENOUS at 09:45

## 2024-10-17 RX ADMIN — SODIUM CHLORIDE, SODIUM LACTATE, POTASSIUM CHLORIDE, AND CALCIUM CHLORIDE: .6; .31; .03; .02 INJECTION, SOLUTION INTRAVENOUS at 09:20

## 2024-10-17 RX ADMIN — ONDANSETRON 4 MG: 2 INJECTION INTRAMUSCULAR; INTRAVENOUS at 10:34

## 2024-10-17 RX ADMIN — DEXAMETHASONE SODIUM PHOSPHATE 10 MG: 10 INJECTION, SOLUTION INTRAMUSCULAR; INTRAVENOUS at 09:37

## 2024-10-17 RX ADMIN — LIDOCAINE HYDROCHLORIDE 50 MG: 10 INJECTION, SOLUTION EPIDURAL; INFILTRATION; INTRACAUDAL; PERINEURAL at 09:25

## 2024-10-17 RX ADMIN — PHENYLEPHRINE HYDROCHLORIDE 100 MCG: 10 INJECTION INTRAVENOUS at 09:56

## 2024-10-17 RX ADMIN — PHENYLEPHRINE HYDROCHLORIDE 200 MCG: 10 INJECTION INTRAVENOUS at 09:35

## 2024-10-17 RX ADMIN — PHENYLEPHRINE HYDROCHLORIDE 100 MCG: 10 INJECTION INTRAVENOUS at 10:38

## 2024-10-17 RX ADMIN — PROMETHAZINE HYDROCHLORIDE 12.5 MG: 25 INJECTION INTRAMUSCULAR; INTRAVENOUS at 12:36

## 2024-10-17 RX ADMIN — DEXMEDETOMIDINE HYDROCHLORIDE 8 MCG: 100 INJECTION, SOLUTION, CONCENTRATE INTRAVENOUS at 10:21

## 2024-10-17 RX ADMIN — KETOROLAC TROMETHAMINE 15 MG: 30 INJECTION, SOLUTION INTRAMUSCULAR at 10:34

## 2024-10-17 RX ADMIN — ONDANSETRON 4 MG: 2 INJECTION INTRAMUSCULAR; INTRAVENOUS at 11:27

## 2024-10-17 RX ADMIN — PROPOFOL 200 MG: 10 INJECTION, EMULSION INTRAVENOUS at 09:25

## 2024-10-17 RX ADMIN — CEFAZOLIN SODIUM 2000 MG: 2 SOLUTION INTRAVENOUS at 09:32

## 2024-10-17 RX ADMIN — FENTANYL CITRATE 50 MCG: 50 INJECTION INTRAMUSCULAR; INTRAVENOUS at 09:30

## 2024-10-17 RX ADMIN — PHENYLEPHRINE HYDROCHLORIDE 100 MCG: 10 INJECTION INTRAVENOUS at 09:32

## 2024-10-17 RX ADMIN — HYDROMORPHONE HYDROCHLORIDE 0.5 MG: 1 INJECTION, SOLUTION INTRAMUSCULAR; INTRAVENOUS; SUBCUTANEOUS at 10:10

## 2024-10-17 RX ADMIN — HYDROMORPHONE HYDROCHLORIDE 0.5 MG: 1 INJECTION, SOLUTION INTRAMUSCULAR; INTRAVENOUS; SUBCUTANEOUS at 10:14

## 2024-10-17 NOTE — ANESTHESIA PREPROCEDURE EVALUATION
Procedure:  ORCHIECTOMY INGUINAL (Left: Groin)    Relevant Problems   CARDIO   (+) Premature atrial contraction        Physical Exam    Airway    Mallampati score: III  TM Distance: >3 FB  Neck ROM: full     Dental   No notable dental hx     Cardiovascular  Cardiovascular exam normal    Pulmonary  Pulmonary exam normal     Other Findings        Anesthesia Plan  ASA Score- 2     Anesthesia Type- general with ASA Monitors.         Additional Monitors:     Airway Plan: LMA.           Plan Factors-Exercise tolerance (METS): >4 METS.    Chart reviewed. EKG reviewed.  Existing labs reviewed. Patient summary reviewed.    Patient is not a current smoker.              Induction- intravenous.    Postoperative Plan- Plan for postoperative opioid use.         Informed Consent- Anesthetic plan and risks discussed with patient.  I personally reviewed this patient with the CRNA. Discussed and agreed on the Anesthesia Plan with the CRNA..

## 2024-10-17 NOTE — OP NOTE
OPERATIVE REPORT  PATIENT NAME: Denton Moses    :  1995  MRN: [id number]  Pt Location:  OR ROOM 03    SURGERY DATE: 10/17/2024    Surgeons and Role:     * Young Bobo MD - Primary    Preop Diagnosis:  Testicular mass [N50.89]    Post-Op Diagnosis Codes:     * Testicular mass [N50.89]    Procedure(s):  Left - LEFT RADICAL INGUINAL ORCHIECTOMY    Specimen(s):  ID Type Source Tests Collected by Time Destination   1 : left testicle and spermatic cord Tissue Testis, Left TISSUE EXAM Young Bobo MD 10/17/2024 0946        Estimated Blood Loss:   Minimal    Drains:  None    Anesthesia Type:   General    Operative Indications:  Left Testicular mass [N50.89]      Complications:   None    Indications for Surgery:  Denton Moses is a 29 y.o.yo M who presented with a left testicular mass. On  Ultrasound there was 1.8 cm firm mass in the left testicle with vascular flow on doppler. Tumor markers were obtained and found to be  Normal. Staging imaging has yet to be obtained. He presents for radical orchiectomy. Infertility risk and testicular prosthesis were discussed prior to surgical date as were the risks, benefits, and alternatives pertaining to the procedure. Surgical consent was signed.     Findings:   - left Palpable testicular mass entirely contained within tunica vaginalis  - Contralateral testes normal to palpation  - Excellent hemostasis   - no scrotal violation     Procedure in Detail:  The patient was identified and marked in the Pre-Op holding area to the satisfaction of Nursing and Anesthesia. The risks and benefits of the procedure were explained to the patient, as well as any alternative therapies. The patient agreed to proceed with the procedures listed above and written consent was confirmed. Pre-operative antibiotics were administered.     The patient was transported to the operating room where a pre-procedural timeout was performed with all parties in agreement to the patient's  identity as well as the scheduled procedure. The patient was placed on the operating room table in the supine position. After successful induction of anesthesia the patient was prepped and draped in the usual standard fashion.     A 5 cm oblique incision was made over the left external inguinal ring. The electrocautery was used to dissect down to Ayanna's fascia. This was incised and electrocautery was used to dissect down to the inguinal canal. The shelving edge of the thigh was visualized and blunt dissection was utilized to clean the surface of the external oblique aponeurosis.  The external ring was then visualized and an incision was made parallel to the fibers of the fascia 3 cm proximal to the external ring.  A closed Metzenbaum scissors was used to bluntly push the tissue away from the underside of the aponeurosis in order to avoid injury to the ilioinguinal nerve.  A scalpel was then used to divide the fascia to the external ring. . The cord was isolated by sweeping the lateral attachments with peanut dissector sponge. Once around the cord circumferentially a penrose drain was doubly looped around the cord and tightened to act as a tourniquet. A finger was passed distally into the scrotum in order to break apart the superficial attachments. The testicle was then delivered into the field. The gubernaculum was taken down with electrocautery ensuring that the scrotal skin was not violated. The lateral external spermatic fascia and cremasteric attachments that remained were divided.  The cord was dissected proximally until the peritoneum was encountered. The cord was then clamped and cut at the level of the internal inguinal ring. Local anesthetic was injected into the cord for a block. A 0 silk suture ligature was used to ligate the cord and a long tail was left. Another 0 vicryl suture ligature was placed around the cord before releasing the cord stump into the retroperitoneum. The surgical field and scrotal  sac were inspected for bleeding. There was no bleeding. The fascial edges of the inguinal canal were reapproximated with running 3-0 PDS with care not to overtighten to avoid nerve impingement. Ayanna's fascia was reapproximated with interrupted 3-0 vicryl. The skin was closed with running 4-0 monocryl. Surgical glue was applied to the incision. The incision was injected with 0.5% marcaine subcutaneously. Counts were correct.      The procedure was complete and the patient was awoken from anesthesia. The patient was taken to PACU in stable condition.    I, Young Bobo MD, performed the entire procedure as the primary attending surgeon.    Young Bobo MD   [facility]   [facility]     Patient Disposition:  PACU              SIGNATURE: Young Bobo MD  DATE: October 17, 2024  TIME: 10:46 AM

## 2024-10-17 NOTE — INTERVAL H&P NOTE
H&P reviewed. After examining the patient I find no changes in the patients condition since the H&P had been written.    Vitals:    10/17/24 0825   BP: 122/65   Pulse: 77   Resp: 18   Temp: 97.5 °F (36.4 °C)   SpO2: 99%     Proceed with left radical orchiectomy    Young Bobo MD   [facility]   [facility]

## 2024-10-17 NOTE — DISCHARGE INSTR - AVS FIRST PAGE
"Mr.  ,   Your surgery went well.  We removed your testicle and its blood supply without complication.     Please take it very easy at home for the next 3 days with minimal walking around.  After 3 days you can walk around as much as he would like but should not do any heavy lifting or running for 2 weeks.    Try to wear supportive underwear for 1 week (\"sporty\"/spandex type boxer briefs).    Some bruising and swelling is normal.  Please let us know if you are having excessive swelling the scrotum or at the incision site or significant pain.    Most patients are able to control their pain adequately with Tylenol or Motrin. Usually antibiotics are not needed after this surgery.    Please call if you have questions or concerns ()    Orchiectomy   WHAT YOU NEED TO KNOW:   Orchiectomy, also called orchidectomy, is surgery to remove one or both of your testicles.       DISCHARGE INSTRUCTIONS:   Call your local emergency number (911 in the [place]) if:   You feel lightheaded and short of breath.    You have chest pain when you take a deep breath or cough.    You cough up blood.    Seek care immediately if:   You have severe pain or swelling in your legs.    You have lower abdominal or back pain that does not go away, even after you take pain medicine.    You have trouble urinating or having a bowel movement.    Your incision is swollen, red, bleeding, or has pus coming from it.    Your stitches come apart.    Your leg feels warm, tender, and painful. It may look swollen and red.    Call your doctor or surgeon if:   You have nausea or are vomiting.    You have a cough or feel weak and achy.    You have a fever or chills.    You have questions or concerns about your condition or care.      Care for the surgery area:   You will have a bandage over your surgery site.  Do not take the bandage off until your healthcare provider says it is okay.    You may need to wear a jock strap for support and comfort.  Ask for more " information about caring for your wound and using your jock strap.    Apply ice to your surgery area.  Ice helps decrease swelling and pain. Ice may also help prevent tissue damage. Use an ice pack or put crushed ice in a plastic bag. Cover it with a towel, and place it on your incision for 15 to 20 minutes every hour as directed.    Pain Control     Pain control is important to the healing process. You should expect to have some pain in the days after surgery even when on pain medication. The goal of the medication is not to make you completely pain free, but instead to make sure that your pain does not prevent you from performing daily activities and recovering from your procedure. I understand anxiety that you may have taking stronger pain medications. Please follow these instructions in order to minimize the use of addictive pain medications while making sure that your pain is treated appropriately so that you can focus on healing from your surgery.     Tylenol (non-addictive)   If you have a prescription for Tylenol, please take this medication at a dose of 1000mg (two extra strength Tylenol pills) every 6-8 hours around the clock. DO NOT take more than 4000mg of Tylenol in a 24 hour period. Tylenol is a powerful pain medication at these doses and it is important to take is as scheduled to keep your pain under control. Tylenol should typically be the last medication that you stop when your pain is improved.     Motrin (non-addictive)   If you have a prescription for Motrin, please take this medication 600mg every 6-8 hours around the clock. This medication can have an effect on your kidneys so it is important that you stay well hydrated while taking it. Avoid taking this medication on an empty stomach.

## 2024-10-17 NOTE — ANESTHESIA POSTPROCEDURE EVALUATION
Post-Op Assessment Note    CV Status:  Stable  Pain Score: 0    Pain management: adequate       Mental Status:  Sleepy   Hydration Status:  Stable   PONV Controlled:  None   Airway Patency:  Patent     Post Op Vitals Reviewed: Yes    No anethesia notable event occurred.    Staff: Anesthesiologist           Last Filed PACU Vitals:  Vitals Value Taken Time   Temp     Pulse 81 10/17/24 1057   /61 10/17/24 1057   Resp 16 10/17/24 1057   SpO2 98 % 10/17/24 1057   Vitals shown include unfiled device data.    Modified Lucia:  Activity: 2 (10/17/2024  2:40 PM)  Respiration: 2 (10/17/2024  2:40 PM)  Circulation: 2 (10/17/2024  2:40 PM)  Consciousness: 2 (10/17/2024  2:40 PM)  Oxygen Saturation: 2 (10/17/2024  2:40 PM)  Modified Lucia Score: 10 (10/17/2024  2:40 PM)

## 2024-10-17 NOTE — TELEPHONE ENCOUNTER
----- Message from Young Bobo MD sent at 10/17/2024 12:12 PM EDT -----  This patient is post-op after left radical orchiectomy with me today     Can we please have him follow up with me in 2 weeks for pathology discussion? He should get a CT scan done before he sees me, which is ordered.    Patient scheduled 11/7 with Ct scan prior

## 2024-10-18 NOTE — TELEPHONE ENCOUNTER
Post Op Note    Denton Moses is a 29 y.o. male s/p left orchiectomy performed 10/17/2024.  Denton Moses is a patient of Dr. Bobo and is seen at the [place] office.     How would you rate your pain on a scale from 1 to 10, 10 being the worst pain ever? 0 minor  Have you had a fever? No  Have your bowel movements been regular? No  Do you have any difficulty urinating? No  If the patient has an incision- do you have any redness around the incision or any drainage from the incision? No  Do you have any other questions or concerns that I can address at this time? Gave him number to scheduled CT scan prior to appointment and he is aware can call our office ovre weekend if he has any concerns

## 2024-10-30 ENCOUNTER — HOSPITAL ENCOUNTER (OUTPATIENT)
Dept: CT IMAGING | Facility: HOSPITAL | Age: 29
Discharge: HOME | End: 2024-10-30
Payer: COMMERCIAL

## 2024-10-30 DIAGNOSIS — N50.89 TESTICULAR MASS: ICD-10-CM

## 2024-10-30 PROCEDURE — 74177 CT ABD & PELVIS W/CONTRAST: CPT

## 2024-10-30 PROCEDURE — 71260 CT THORAX DX C+: CPT

## 2024-10-30 RX ADMIN — IOHEXOL 100 ML: 350 INJECTION, SOLUTION INTRAVENOUS at 10:42

## 2024-11-04 NOTE — PROGRESS NOTES
"Ambulatory Visit  Name: Denton Moses      : 1995      MRN: [id number]  Encounter Provider: Young Bobo MD  Encounter Date: 2024   [facility]    Assessment & Plan  Testicular lesion  The patient and I had a lengthy discussion regarding his unusual pathology. I explained the differential diagnosis which includes a \"burned out\" primary testicular cancer, a vascular event, and testicular trauma.     The findings of possible associated vasculitis could indicate a systemic immunologic issue rather than a malignancy.     Overall, given his normal staging imaging and tumor markers, I am reassured from an oncologic standpoint.     - referral to rheumatology  - Follow up 3 months with repeat tumor markers in case this does represent a burned out primary tumor (unlikely)   - CT scan results of hepatic steatosis and splenomegaly reviewed with patient. He has lost significant weight and is following his lipids with his primary care doctor. I will forward this note and results to his PCP.       Orders:    Ambulatory Referral to Rheumatology; Future    AFP tumor marker; Future    HCG, tumor marker; Future      History of Present Illness     Denton Moses is a 29 y.o. 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 He has been recovering well since the procedure. He has not been requiring pain medication.     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     History obtained from : patient        Objective     /80 (BP Location: Left arm, Patient Position: Sitting, Cuff Size: Adult)   Pulse 67   Ht 5' 7\" (1.702 m)   Wt 97.1 kg (214 lb)   SpO2 98%   BMI 33.52 kg/m²   Physical Exam  Vitals:    24 0839   BP: 110/80   Pulse: 67   SpO2: 98%      General: Well appearing, no acute distress   HEENT: normocephalic, atraumatic  Eyes: " "extraocular movements intact  Cardiovascular: normal rate   Respiratory: no respiratory distress, effort normal   Abdominal: Non-distended, non-tender   : Left inguinal incision healing well  MSK: Grossly normal range of motion   Neurological: No gross focal deficits  Behavioral: Normal mood and affect     Results  No results found for: \"PSA\"  Lab Results   Component Value Date    CALCIUM 9.5 10/16/2024    K 3.8 10/16/2024    CO2 26 10/16/2024     10/16/2024    BUN 14 10/16/2024    CREATININE 0.91 10/16/2024     Lab Results   Component Value Date    WBC 8.20 10/16/2024    HGB 15.1 10/16/2024    HCT 43.5 10/16/2024    MCV 88 10/16/2024     10/16/2024       Imaging  I have personally reviewed pertinent films in PACS and my interpretation is CT scan shows hepatic steatosis and mild splenomegaly.    No results found for this or any previous visit.      Office Urine Dip  No results found for this or any previous visit (from the past 1 hour(s)).]    Administrative Statements         "

## 2024-11-07 ENCOUNTER — OFFICE VISIT (OUTPATIENT)
Dept: UROLOGY | Facility: AMBULATORY SURGERY CENTER | Age: 29
End: 2024-11-07
Payer: COMMERCIAL

## 2024-11-07 VITALS
HEART RATE: 67 BPM | DIASTOLIC BLOOD PRESSURE: 80 MMHG | SYSTOLIC BLOOD PRESSURE: 110 MMHG | WEIGHT: 214 LBS | OXYGEN SATURATION: 98 % | BODY MASS INDEX: 33.59 KG/M2 | HEIGHT: 67 IN

## 2024-11-07 DIAGNOSIS — N50.9 TESTICULAR LESION: Primary | ICD-10-CM

## 2024-11-07 PROCEDURE — 99214 OFFICE O/P EST MOD 30 MIN: CPT

## 2024-11-07 RX ORDER — CETIRIZINE HYDROCHLORIDE 10 MG/1
10 TABLET ORAL AS NEEDED
COMMUNITY

## 2024-11-14 ENCOUNTER — TELEPHONE (OUTPATIENT)
Age: 29
End: 2024-11-14

## 2024-11-14 PROCEDURE — 88342 IMHCHEM/IMCYTCHM 1ST ANTB: CPT | Performed by: PATHOLOGY

## 2024-11-14 PROCEDURE — 88305 TISSUE EXAM BY PATHOLOGIST: CPT | Performed by: PATHOLOGY

## 2024-11-14 NOTE — TELEPHONE ENCOUNTER
lvm today to see if he's switching to [facility] as a provider- he saw Ricardo in Oct; can't switch back and forth with offices; AS, 11/14- he can also go to Urgent Care     Gave office p#: [phone]

## 2024-11-15 ENCOUNTER — OFFICE VISIT (OUTPATIENT)
Age: 29
End: 2024-11-15
Payer: COMMERCIAL

## 2024-11-15 VITALS
OXYGEN SATURATION: 98 % | DIASTOLIC BLOOD PRESSURE: 82 MMHG | RESPIRATION RATE: 16 BRPM | HEIGHT: 67 IN | SYSTOLIC BLOOD PRESSURE: 128 MMHG | HEART RATE: 80 BPM | BODY MASS INDEX: 33.9 KG/M2 | TEMPERATURE: 98 F | WEIGHT: 216 LBS

## 2024-11-15 DIAGNOSIS — J01.10 ACUTE NON-RECURRENT FRONTAL SINUSITIS: ICD-10-CM

## 2024-11-15 DIAGNOSIS — J06.9 UPPER RESPIRATORY TRACT INFECTION, UNSPECIFIED TYPE: Primary | ICD-10-CM

## 2024-11-15 PROCEDURE — 99213 OFFICE O/P EST LOW 20 MIN: CPT

## 2024-11-15 NOTE — PROGRESS NOTES
INTERNAL MEDICINE FOLLOW-UP VISIT  [facility] - [facility] INTERNAL MEDICINE [address]    NAME: Denton Moses  AGE: 29 y.o. SEX: male  : 1995     DATE: 11/15/2024     Assessment and Plan:   1.  Acute nonrecurrent frontal sinusitis  Recommended starting Augmentin twice daily for 7 days.  Reviewed potential adverse effects.  Take with food and probiotic.  Continue to increase fluid intake and vitamin C.  Notify the office of any worsening congestion, fevers greater than 103, or chills.        No follow-ups on file.       Chief Complaint:     Chief Complaint   Patient presents with    Cough    Sore Throat     Nasal congestion, sinus pressure, symptoms started Monday.      History of Present Illness:   Patient is a 29-year-old male that presents today for sore throat and cough.  Symptoms started on Monday.  He COVID tested at home and it was negative.  He notes worsening sinus congestion and is struggling to sleep.  He denies any sick contacts.  He is eating and drinking okay.  He does have a history of sinus infections.    The following portions of the patient's history were reviewed and updated as appropriate: allergies, current medications, past family history, past medical history, past social history, past surgical history and problem list.     Review of Systems:     Review of Systems   Constitutional:  Negative for appetite change, chills and fever.   HENT:  Positive for rhinorrhea, sinus pressure and sinus pain. Negative for ear discharge, ear pain, sore throat and trouble swallowing.    Eyes:  Negative for pain and itching.   Respiratory:  Positive for cough (Purulent and dry). Negative for chest tightness, shortness of breath and wheezing.    Cardiovascular:  Negative for chest pain.   Gastrointestinal:  Negative for abdominal pain, constipation, diarrhea and vomiting.   Genitourinary:  Negative for difficulty urinating and dysuria.   Musculoskeletal:  Negative for arthralgias  "and myalgias.   Skin:  Negative for rash.   Neurological:  Positive for headaches. Negative for dizziness and light-headedness.   Psychiatric/Behavioral:  Negative for sleep disturbance.         Past Medical History:     Past Medical History:   Diagnosis Date    Allergic     Had allergies since childhood    Environmental allergies     Headache(784.0)     Allergy related, typically in spring and fall    Migraines     Pharyngitis 03/07/2022    Right non-suppurative otitis media 03/07/2022        Current Medications:     Current Outpatient Medications:     Ascorbic Acid (Vitamin C) 500 MG CAPS, Take 250 mg by mouth daily, Disp: , Rfl:     cetirizine (ZyrTEC) 10 mg tablet, Take 10 mg by mouth if needed for allergies, Disp: , Rfl:     fexofenadine (ALLEGRA) 60 MG tablet, Take 60 mg by mouth daily, Disp: , Rfl:     fluticasone (FLONASE) 50 mcg/act nasal spray, 1 spray into each nostril daily, Disp: , Rfl:     ibuprofen (MOTRIN) 200 mg tablet, Take 3 tablets (600 mg total) by mouth every 6 (six) hours for 5 days, Disp: 60 tablet, Rfl:     acetaminophen (TYLENOL) 500 mg tablet, Take 500 mg by mouth every 6 (six) hours as needed for mild pain (Patient not taking: Reported on 11/7/2024), Disp: , Rfl:     ibuprofen (MOTRIN) 200 mg tablet, Take by mouth every 6 (six) hours as needed for mild pain (Patient not taking: Reported on 11/7/2024), Disp: , Rfl:      Allergies:     Allergies   Allergen Reactions    Codeine Other (See Comments)     As child-unknown reaction        Physical Exam:     /82 (BP Location: Left arm)   Pulse 80   Temp 98 °F (36.7 °C) (Temporal)   Resp 16   Ht 5' 7\" (1.702 m)   Wt 98 kg (216 lb)   SpO2 98%   BMI 33.83 kg/m²     Physical Exam  Vitals and nursing note reviewed.   Constitutional:       General: He is awake.      Appearance: Normal appearance. He is well-developed and well-groomed. He is obese.   HENT:      Head: Normocephalic and atraumatic.      Right Ear: Hearing, tympanic membrane, " ear canal and external ear normal.      Left Ear: Hearing, tympanic membrane, ear canal and external ear normal.      Nose: Nose normal.      Mouth/Throat:      Lips: Pink.      Mouth: Mucous membranes are moist.      Pharynx: Uvula midline. Posterior oropharyngeal erythema present.   Eyes:      General: Lids are normal. Vision grossly intact. Gaze aligned appropriately.      Conjunctiva/sclera: Conjunctivae normal.   Neck:      Vascular: No carotid bruit.      Trachea: Trachea and phonation normal.   Cardiovascular:      Rate and Rhythm: Normal rate and regular rhythm.      Heart sounds: Normal heart sounds, S1 normal and S2 normal. No murmur heard.     No friction rub. No gallop.   Pulmonary:      Effort: Pulmonary effort is normal.      Breath sounds: Normal breath sounds and air entry. No decreased breath sounds, wheezing, rhonchi or rales.   Abdominal:      General: Abdomen is protuberant.   Musculoskeletal:      Cervical back: Neck supple.      Right lower leg: No edema.      Left lower leg: No edema.   Lymphadenopathy:      Cervical: No cervical adenopathy.   Skin:     General: Skin is warm.      Capillary Refill: Capillary refill takes less than 2 seconds.   Neurological:      Mental Status: He is alert.   Psychiatric:         Attention and Perception: Attention and perception normal.         Mood and Affect: Mood and affect normal.         Speech: Speech normal.         Behavior: Behavior normal. Behavior is cooperative.         Thought Content: Thought content normal.         Cognition and Memory: Cognition and memory normal.         Judgment: Judgment normal.           Data:     Laboratory Results: I have personally reviewed the pertinent laboratory results/reports   Radiology/Other Diagnostic Testing Results: Results Review Statement: No pertinent imaging studies reviewed.    Mandi Pollock PA-C  [facility] [address]

## 2024-11-15 NOTE — LETTER
November 15, 2024     Patient: Denton Moses  YOB: 1995  Date of Visit: 11/15/2024      To Whom it May Concern:    Denton Moses is under my professional care. Denton was seen in my office on 11/15/2024. Denton may return to work on 11/15/24 .    If you have any questions or concerns, please don't hesitate to call.         Sincerely,          Mandi Pollcok PA-C        CC: No Recipients

## 2025-02-07 ENCOUNTER — OFFICE VISIT (OUTPATIENT)
Dept: UROLOGY | Facility: AMBULATORY SURGERY CENTER | Age: 30
End: 2025-02-07
Payer: COMMERCIAL

## 2025-02-07 VITALS
OXYGEN SATURATION: 98 % | DIASTOLIC BLOOD PRESSURE: 80 MMHG | SYSTOLIC BLOOD PRESSURE: 122 MMHG | HEART RATE: 96 BPM | WEIGHT: 210 LBS | BODY MASS INDEX: 32.96 KG/M2 | HEIGHT: 67 IN

## 2025-02-07 DIAGNOSIS — N50.89 TESTICULAR MASS: Primary | ICD-10-CM

## 2025-02-07 PROCEDURE — 99213 OFFICE O/P EST LOW 20 MIN: CPT

## 2025-02-07 NOTE — Clinical Note
Patient did not get tumor markers prior to appt, he will get them sometime next week he said. They should be coming to your inbasket. Just curious if they do come back elevated are next steps referral to medical oncology and repeat imaging? Thanks

## 2025-02-07 NOTE — PROGRESS NOTES
"Name: Denton Moses      : 1995      MRN: [id number]  Encounter Provider: MARILYN Cole  Encounter Date: 2025   Encounter department: [facility] UROLOGY [place]  :  Assessment & Plan  Testicular mass  The patient last met with Dr. Bobo On 2024.  Patient had a discussion with him regarding his pathology from his orchiectomy.  Final pathology demonstrated a necrotic focus and hemorrhage and associated vasculitis.     Dr. Bobo discussed with the patient that this could be a \"burned-out\" primary testicular cancer, a vascular event and testicular trauma.  A referral was placed for rheumatology for further assessment.      Patient was to get repeat tumor markers prior to today's appointment but unfortunately this was not obtained.  Prior tumor markers were low and within normal limits.  I recommend that he get the tumor markers drawn now for further assessment.  Once we have the labs we will call him with the results and discuss next steps.      History of Present Illness   Denton Moses is a 29 y.o. male who presents today to the office for follow-up of a testicular mass.  He was last seen by Dr. Bobo in 2024.  He states that since that time he has overall been feeling well.  He denies any urinary/urological complaints.    Pathology was not malignant and demonstrated a necrotic focus and hemorrhage with associated vasculitis.    Review of Systems   Constitutional:  Negative for chills and fever.   Respiratory: Negative.  Negative for cough and shortness of breath.    Cardiovascular: Negative.  Negative for chest pain.   Gastrointestinal: Negative.  Negative for abdominal distention, abdominal pain, nausea and vomiting.   Genitourinary:  Negative for decreased urine volume, difficulty urinating, dysuria, flank pain, frequency, hematuria, penile discharge, penile pain, penile swelling, scrotal swelling, testicular pain and urgency.   Skin: Negative.  " "Negative for rash.   Neurological: Negative.    Hematological:  Negative for adenopathy. Does not bruise/bleed easily.          Objective   There were no vitals taken for this visit.    Physical Exam  Vitals reviewed.   Constitutional:       Appearance: Normal appearance.   HENT:      Head: Normocephalic and atraumatic.   Eyes:      Pupils: Pupils are equal, round, and reactive to light.   Cardiovascular:      Rate and Rhythm: Normal rate.   Pulmonary:      Effort: Pulmonary effort is normal.   Musculoskeletal:      Cervical back: Normal range of motion.   Skin:     General: Skin is warm and dry.   Neurological:      General: No focal deficit present.      Mental Status: He is alert and oriented to person, place, and time.   Psychiatric:         Mood and Affect: Mood normal.         Behavior: Behavior normal.         Thought Content: Thought content normal.         Judgment: Judgment normal.          Results  No results found for: \"PSA\"  Lab Results   Component Value Date    CALCIUM 9.5 10/16/2024    K 3.8 10/16/2024    CO2 26 10/16/2024     10/16/2024    BUN 14 10/16/2024    CREATININE 0.91 10/16/2024     Lab Results   Component Value Date    WBC 8.20 10/16/2024    HGB 15.1 10/16/2024    HCT 43.5 10/16/2024    MCV 88 10/16/2024     10/16/2024       Office Urine Dip  No results found for this or any previous visit (from the past hour).]      "

## 2025-02-07 NOTE — ASSESSMENT & PLAN NOTE
"The patient last met with Dr. Bobo On 11/7/2024.  Patient had a discussion with him regarding his pathology from his orchiectomy.  Final pathology demonstrated a necrotic focus and hemorrhage and associated vasculitis.     Dr. Bobo discussed with the patient that this could be a \"burned-out\" primary testicular cancer, a vascular event and testicular trauma.  A referral was placed for rheumatology for further assessment.      Patient was to get repeat tumor markers prior to today's appointment but unfortunately this was not obtained.  Prior tumor markers were low and within normal limits.  I recommend that he get the tumor markers drawn now for further assessment.  Once we have the labs we will call him with the results and discuss next steps.  "

## 2025-02-11 ENCOUNTER — APPOINTMENT (OUTPATIENT)
Dept: LAB | Facility: HOSPITAL | Age: 30
End: 2025-02-11
Payer: COMMERCIAL

## 2025-02-11 DIAGNOSIS — N50.9 TESTICULAR LESION: ICD-10-CM

## 2025-02-11 LAB
AFP-TM SERPL-MCNC: 2.89 NG/ML (ref 0–9)
HCG-TM SERPL-SCNC: <0.6 MLU/ML

## 2025-02-11 PROCEDURE — 36415 COLL VENOUS BLD VENIPUNCTURE: CPT

## 2025-02-11 PROCEDURE — 84702 CHORIONIC GONADOTROPIN TEST: CPT

## 2025-02-11 PROCEDURE — 82105 ALPHA-FETOPROTEIN SERUM: CPT

## 2025-02-12 ENCOUNTER — RESULTS FOLLOW-UP (OUTPATIENT)
Dept: UROLOGY | Facility: CLINIC | Age: 30
End: 2025-02-12

## 2025-02-12 NOTE — RESULT ENCOUNTER NOTE
AFP slightly elevated from prior but within normal range. No further follow up recommended as a malignant related elevation would have increased much more significantly over a 3 month period.

## 2025-06-03 LAB — HBA1C MFR BLD HPLC: 5.6 %

## 2025-06-10 ENCOUNTER — APPOINTMENT (OUTPATIENT)
Dept: LAB | Facility: HOSPITAL | Age: 30
End: 2025-06-10
Payer: COMMERCIAL

## 2025-06-10 DIAGNOSIS — R73.01 IMPAIRED FASTING GLUCOSE: ICD-10-CM

## 2025-06-10 DIAGNOSIS — N45.3 EPIDIDYMO-ORCHITIS: ICD-10-CM

## 2025-06-10 DIAGNOSIS — Z13.220 SCREENING FOR LIPID DISORDERS: ICD-10-CM

## 2025-06-10 DIAGNOSIS — Z13.0 SCREENING FOR DEFICIENCY ANEMIA: ICD-10-CM

## 2025-06-10 DIAGNOSIS — Z11.59 NEED FOR HEPATITIS C SCREENING TEST: ICD-10-CM

## 2025-06-10 DIAGNOSIS — R53.83 OTHER FATIGUE: ICD-10-CM

## 2025-06-10 DIAGNOSIS — Z11.4 SCREENING FOR HIV (HUMAN IMMUNODEFICIENCY VIRUS): ICD-10-CM

## 2025-06-10 LAB
ALBUMIN SERPL BCG-MCNC: 4.7 G/DL (ref 3.5–5)
ALP SERPL-CCNC: 59 U/L (ref 34–104)
ALT SERPL W P-5'-P-CCNC: 22 U/L (ref 7–52)
ANION GAP SERPL CALCULATED.3IONS-SCNC: 6 MMOL/L (ref 4–13)
AST SERPL W P-5'-P-CCNC: 26 U/L (ref 13–39)
BASOPHILS # BLD AUTO: 0.03 THOUSANDS/ÂΜL (ref 0–0.1)
BASOPHILS NFR BLD AUTO: 1 % (ref 0–1)
BILIRUB SERPL-MCNC: 0.86 MG/DL (ref 0.2–1)
BILIRUB UR QL STRIP: NEGATIVE
BUN SERPL-MCNC: 14 MG/DL (ref 5–25)
CALCIUM SERPL-MCNC: 9.8 MG/DL (ref 8.4–10.2)
CHLORIDE SERPL-SCNC: 103 MMOL/L (ref 96–108)
CHOLEST SERPL-MCNC: 187 MG/DL (ref ?–200)
CLARITY UR: CLEAR
CO2 SERPL-SCNC: 30 MMOL/L (ref 21–32)
COLOR UR: COLORLESS
CREAT SERPL-MCNC: 0.81 MG/DL (ref 0.6–1.3)
EOSINOPHIL # BLD AUTO: 0.08 THOUSAND/ÂΜL (ref 0–0.61)
EOSINOPHIL NFR BLD AUTO: 1 % (ref 0–6)
ERYTHROCYTE [DISTWIDTH] IN BLOOD BY AUTOMATED COUNT: 12 % (ref 11.6–15.1)
EST. AVERAGE GLUCOSE BLD GHB EST-MCNC: 111 MG/DL
GFR SERPL CREATININE-BSD FRML MDRD: 120 ML/MIN/1.73SQ M
GLUCOSE P FAST SERPL-MCNC: 101 MG/DL (ref 65–99)
GLUCOSE UR STRIP-MCNC: NEGATIVE MG/DL
HBA1C MFR BLD: 5.5 %
HCT VFR BLD AUTO: 44.9 % (ref 36.5–49.3)
HDLC SERPL-MCNC: 47 MG/DL
HGB BLD-MCNC: 15.6 G/DL (ref 12–17)
HGB UR QL STRIP.AUTO: NEGATIVE
IMM GRANULOCYTES # BLD AUTO: 0.01 THOUSAND/UL (ref 0–0.2)
IMM GRANULOCYTES NFR BLD AUTO: 0 % (ref 0–2)
KETONES UR STRIP-MCNC: NEGATIVE MG/DL
LDLC SERPL CALC-MCNC: 115 MG/DL (ref 0–100)
LEUKOCYTE ESTERASE UR QL STRIP: NEGATIVE
LYMPHOCYTES # BLD AUTO: 2.17 THOUSANDS/ÂΜL (ref 0.6–4.47)
LYMPHOCYTES NFR BLD AUTO: 36 % (ref 14–44)
MCH RBC QN AUTO: 30.5 PG (ref 26.8–34.3)
MCHC RBC AUTO-ENTMCNC: 34.7 G/DL (ref 31.4–37.4)
MCV RBC AUTO: 88 FL (ref 82–98)
MONOCYTES # BLD AUTO: 0.4 THOUSAND/ÂΜL (ref 0.17–1.22)
MONOCYTES NFR BLD AUTO: 7 % (ref 4–12)
NEUTROPHILS # BLD AUTO: 3.29 THOUSANDS/ÂΜL (ref 1.85–7.62)
NEUTS SEG NFR BLD AUTO: 55 % (ref 43–75)
NITRITE UR QL STRIP: NEGATIVE
NONHDLC SERPL-MCNC: 140 MG/DL
NRBC BLD AUTO-RTO: 0 /100 WBCS
PH UR STRIP.AUTO: 7 [PH]
PLATELET # BLD AUTO: 189 THOUSANDS/UL (ref 149–390)
PMV BLD AUTO: 12 FL (ref 8.9–12.7)
POTASSIUM SERPL-SCNC: 4.2 MMOL/L (ref 3.5–5.3)
PROT SERPL-MCNC: 7.5 G/DL (ref 6.4–8.4)
PROT UR STRIP-MCNC: NEGATIVE MG/DL
RBC # BLD AUTO: 5.11 MILLION/UL (ref 3.88–5.62)
SODIUM SERPL-SCNC: 139 MMOL/L (ref 135–147)
SP GR UR STRIP.AUTO: 1.01 (ref 1–1.03)
TRIGL SERPL-MCNC: 126 MG/DL (ref ?–150)
TSH SERPL DL<=0.05 MIU/L-ACNC: 0.99 UIU/ML (ref 0.45–4.5)
UROBILINOGEN UR STRIP-ACNC: <2 MG/DL
WBC # BLD AUTO: 5.98 THOUSAND/UL (ref 4.31–10.16)

## 2025-06-10 PROCEDURE — 84443 ASSAY THYROID STIM HORMONE: CPT

## 2025-06-10 PROCEDURE — 86803 HEPATITIS C AB TEST: CPT

## 2025-06-10 PROCEDURE — 83036 HEMOGLOBIN GLYCOSYLATED A1C: CPT

## 2025-06-10 PROCEDURE — 85025 COMPLETE CBC W/AUTO DIFF WBC: CPT

## 2025-06-10 PROCEDURE — 36415 COLL VENOUS BLD VENIPUNCTURE: CPT

## 2025-06-10 PROCEDURE — 87389 HIV-1 AG W/HIV-1&-2 AB AG IA: CPT

## 2025-06-10 PROCEDURE — 80061 LIPID PANEL: CPT

## 2025-06-10 PROCEDURE — 81003 URINALYSIS AUTO W/O SCOPE: CPT

## 2025-06-10 PROCEDURE — 80053 COMPREHEN METABOLIC PANEL: CPT

## 2025-06-11 LAB
HCV AB SER QL: NORMAL
HIV 1+2 AB+HIV1 P24 AG SERPL QL IA: NORMAL

## 2025-06-12 ENCOUNTER — OFFICE VISIT (OUTPATIENT)
Age: 30
End: 2025-06-12
Payer: COMMERCIAL

## 2025-06-12 VITALS
HEART RATE: 67 BPM | SYSTOLIC BLOOD PRESSURE: 120 MMHG | WEIGHT: 218 LBS | OXYGEN SATURATION: 99 % | DIASTOLIC BLOOD PRESSURE: 62 MMHG | HEIGHT: 67 IN | BODY MASS INDEX: 34.21 KG/M2

## 2025-06-12 DIAGNOSIS — N50.89 TESTICULAR MASS: ICD-10-CM

## 2025-06-12 DIAGNOSIS — Z00.00 ANNUAL PHYSICAL EXAM: Primary | ICD-10-CM

## 2025-06-12 PROCEDURE — 99395 PREV VISIT EST AGE 18-39: CPT

## 2025-06-12 NOTE — PROGRESS NOTES
Adult Annual Physical  Name: Denton Moses      : 1995      MRN: [id number]  Encounter Provider: Tera Shore PA-C  Encounter Date: 2025   Encounter department: [facility] INTERNAL MEDICINE [address]    :  Assessment & Plan  Annual physical exam  Patient is a pleasant 30-year-old male presenting for annual physical.  Baseline lab work reviewed.  Up-to-date on all other health maintenance       BMI 33.0-33.9,adult  Continue with recommendation of daily exercise and dietary management       Testicular mass  Follows with urology.           Preventive Screenings:    - Prostate cancer screening: screening not indicated          History of Present Illness     Adult Annual Physical:  Patient presents for annual physical. 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     Diet and Physical Activity:  - Diet/Nutrition: no special diet.  - Exercise: no formal exercise.    General Health:  - Sleep: sleeps well and 7-8 hours of sleep on average.  - Hearing: normal hearing right ear.  - Vision: vision problems and wears glasses.  - Dental: regular dental visits.     Health:  - History of STDs: no.   - Urinary symptoms: none.     Advanced Care Planning:  - Has an advanced directive?: no    - Has a durable medical POA?: no    - ACP document given to patient?: no      Review of Systems   Constitutional:  Negative for chills, fatigue and fever.   HENT:  Negative for ear discharge, ear pain, postnasal drip, rhinorrhea, sinus pressure, sinus pain, sore throat, tinnitus and trouble swallowing.    Eyes:  Negative for pain, discharge and itching.   Respiratory:  Negative for cough, shortness of breath and wheezing.    Cardiovascular:  Negative for chest pain, palpitations and leg swelling.   Gastrointestinal:  Negative for abdominal  "pain, constipation, diarrhea, nausea and vomiting.   Endocrine: Negative for polydipsia, polyphagia and polyuria.   Genitourinary:  Negative for difficulty urinating, frequency, hematuria and urgency.   Musculoskeletal:  Negative for arthralgias, joint swelling and myalgias.   Skin:  Negative for color change.   Allergic/Immunologic: Negative for environmental allergies.   Neurological:  Negative for dizziness, weakness, light-headedness, numbness and headaches.   Hematological:  Negative for adenopathy.   Psychiatric/Behavioral:  Negative for decreased concentration and sleep disturbance. The patient is not nervous/anxious.          Objective   /62   Pulse 67   Ht 5' 7\" (1.702 m)   Wt 98.9 kg (218 lb)   SpO2 99%   BMI 34.14 kg/m²     Physical Exam  Vitals and nursing note reviewed.   Constitutional:       General: He is awake.      Appearance: Normal appearance. He is well-developed, well-groomed and overweight.   HENT:      Head: Normocephalic and atraumatic.      Right Ear: Hearing and external ear normal.      Left Ear: Hearing and external ear normal.      Nose: Nose normal.      Mouth/Throat:      Lips: Pink.     Eyes:      General: Lids are normal. Vision grossly intact. Gaze aligned appropriately.      Extraocular Movements: Extraocular movements intact.     Neck:      Trachea: Trachea and phonation normal.     Cardiovascular:      Rate and Rhythm: Normal rate and regular rhythm.      Heart sounds: Normal heart sounds, S1 normal and S2 normal. No murmur heard.     No friction rub. No gallop.   Pulmonary:      Effort: Pulmonary effort is normal.      Breath sounds: Normal breath sounds and air entry. No decreased breath sounds, wheezing, rhonchi or rales.   Abdominal:      General: Abdomen is protuberant.     Musculoskeletal:      Cervical back: Neck supple.     Skin:     General: Skin is warm.      Capillary Refill: Capillary refill takes less than 2 seconds.     Neurological:      Mental Status: " He is alert.     Psychiatric:         Attention and Perception: Attention and perception normal.         Mood and Affect: Mood and affect normal.         Speech: Speech normal.         Behavior: Behavior normal. Behavior is cooperative.         Thought Content: Thought content normal.         Cognition and Memory: Cognition and memory normal.         Judgment: Judgment normal.

## 2025-06-12 NOTE — PATIENT INSTRUCTIONS
"Patient Education     Routine physical for adults   The Basics   Written by the doctors and editors at [facility]   What is a physical? -- A physical is a routine visit, or \"check-up,\" with your doctor. You might also hear it called a \"wellness visit\" or \"preventive visit.\"  During each visit, the doctor will:   Ask about your physical and mental health   Ask about your habits, behaviors, and lifestyle   Do an exam   Give you vaccines if needed   Talk to you about any medicines you take   Give advice about your health   Answer your questions  Getting regular check-ups is an important part of taking care of your health. It can help your doctor find and treat any problems you have. But it's also important for preventing health problems.  A routine physical is different from a \"sick visit.\" A sick visit is when you see a doctor because of a health concern or problem. Since physicals are scheduled ahead of time, you can think about what you want to ask the doctor.  How often should I get a physical? -- It depends on your age and health. In general, for people age 21 years and older:   If you are younger than 50 years, you might be able to get a physical every 3 years.   If you are 50 years or older, your doctor might recommend a physical every year.  If you have an ongoing health condition, like diabetes or high blood pressure, your doctor will probably want to see you more often.  What happens during a physical? -- In general, each visit will include:   Physical exam - The doctor or nurse will check your height, weight, heart rate, and blood pressure. They will also look at your eyes and ears. They will ask about how you are feeling and whether you have any symptoms that bother you.   Medicines - It's a good idea to bring a list of all the medicines you take to each doctor visit. Your doctor will talk to you about your medicines and answer any questions. Tell them if you are having any side effects that bother you. You " "should also tell them if you are having trouble paying for any of your medicines.   Habits and behaviors - This includes:   Your diet   Your exercise habits   Whether you smoke, drink alcohol, or use drugs   Whether you are sexually active   Whether you feel safe at home  Your doctor will talk to you about things you can do to improve your health and lower your risk of health problems. They will also offer help and support. For example, if you want to quit smoking, they can give you advice and might prescribe medicines. If you want to improve your diet or get more physical activity, they can help you with this, too.   Lab tests, if needed - The tests you get will depend on your age and situation. For example, your doctor might want to check your:   Cholesterol   Blood sugar   Iron level   Vaccines - The recommended vaccines will depend on your age, health, and what vaccines you already had. Vaccines are very important because they can prevent certain serious or deadly infections.   Discussion of screening - \"Screening\" means checking for diseases or other health problems before they cause symptoms. Your doctor can recommend screening based on your age, risk, and preferences. This might include tests to check for:   Cancer, such as breast, prostate, cervical, ovarian, colorectal, prostate, lung, or skin cancer   Sexually transmitted infections, such as chlamydia and gonorrhea   Mental health conditions like depression and anxiety  Your doctor will talk to you about the different types of screening tests. They can help you decide which screenings to have. They can also explain what the results might mean.   Answering questions - The physical is a good time to ask the doctor or nurse questions about your health. If needed, they can refer you to other doctors or specialists, too.  Adults older than 65 years often need other care, too. As you get older, your doctor will talk to you about:   How to prevent falling at " home   Hearing or vision tests   Memory testing   How to take your medicines safely   Making sure that you have the help and support you need at home  All topics are updated as new evidence becomes available and our peer review process is complete.  This topic retrieved from [employer] on: May 02, 2024.  Topic 875304 Version 1.0  Release: 32.4.3 - C32.122  © 2024 [employer] and/or its affiliates. All rights reserved.  Consumer Information Use and Disclaimer   Disclaimer: This generalized information is a limited summary of diagnosis, treatment, and/or medication information. It is not meant to be comprehensive and should be used as a tool to help the user understand and/or assess potential diagnostic and treatment options. It does NOT include all information about conditions, treatments, medications, side effects, or risks that may apply to a specific patient. It is not intended to be medical advice or a substitute for the medical advice, diagnosis, or treatment of a health care provider based on the health care provider's examination and assessment of a patient's specific and unique circumstances. Patients must speak with a health care provider for complete information about their health, medical questions, and treatment options, including any risks or benefits regarding use of medications. This information does not endorse any treatments or medications as safe, effective, or approved for treating a specific patient. [employer] and its affiliates disclaim any warranty or liability relating to this information or the use thereof.The use of this information is governed by the Terms of Use, available at https://www.woltersBundlruwer.com/en/know/clinical-effectiveness-terms. 2024© [employer] and its affiliates and/or licensors. All rights reserved.  Copyright   © 2024 [employer] and/or its affiliates. All rights reserved.

## (undated) DEVICE — SUT PDS II 3-0 SH 27 IN Z316H

## (undated) DEVICE — SUT SILK 2-0 SH 30 IN K833H

## (undated) DEVICE — GAUZE SPONGES,16 PLY: Brand: CURITY

## (undated) DEVICE — SUT SILK 0 30 IN A306H

## (undated) DEVICE — EXOFIN PRECISION PEN HIGH VISCOSITY TOPICAL SKIN ADHESIVE: Brand: EXOFIN PRECISION PEN, 1G

## (undated) DEVICE — SUT MONOCRYL 4-0 PS-2 27 IN Y426H

## (undated) DEVICE — SURGICAL CLIPPER BLADE GENERAL USE

## (undated) DEVICE — KERLIX BANDAGE ROLL: Brand: KERLIX

## (undated) DEVICE — TUBING SUCTION 5MM X 12 FT

## (undated) DEVICE — SCROTAL SUPPORT LGE

## (undated) DEVICE — BETHLEHEM UNIVERSAL MINOR GEN: Brand: CARDINAL HEALTH

## (undated) DEVICE — SUT SILK 0 SH 30 IN K834H

## (undated) DEVICE — ELECTRODE ELECTROSURGICAL EDGE PTFE STANDARD L6.5 IN OD3/32 TEFLON COATED

## (undated) DEVICE — PENROSE DRAIN, 18 X 3 8: Brand: CARDINAL HEALTH

## (undated) DEVICE — MEDI-VAC YANK SUCT HNDL W/TPRD BULBOUS TIP: Brand: CARDINAL HEALTH

## (undated) DEVICE — GLOVE SRG BIOGEL 7

## (undated) DEVICE — GLOVE INDICATOR PI UNDERGLOVE SZ 7 BLUE